# Patient Record
Sex: FEMALE | Race: WHITE | NOT HISPANIC OR LATINO | URBAN - METROPOLITAN AREA
[De-identification: names, ages, dates, MRNs, and addresses within clinical notes are randomized per-mention and may not be internally consistent; named-entity substitution may affect disease eponyms.]

---

## 2017-09-12 ENCOUNTER — INPATIENT (INPATIENT)
Facility: HOSPITAL | Age: 82
LOS: 5 days | Discharge: NURSING, REHAB OR LONG-TERM CARE | DRG: 481 | End: 2017-09-18
Attending: INTERNAL MEDICINE | Admitting: INTERNAL MEDICINE
Payer: MEDICARE

## 2017-09-12 VITALS
SYSTOLIC BLOOD PRESSURE: 162 MMHG | OXYGEN SATURATION: 98 % | WEIGHT: 119.05 LBS | RESPIRATION RATE: 18 BRPM | DIASTOLIC BLOOD PRESSURE: 73 MMHG | TEMPERATURE: 98 F | HEART RATE: 63 BPM

## 2017-09-12 LAB
ALBUMIN SERPL ELPH-MCNC: 3.8 G/DL — SIGNIFICANT CHANGE UP (ref 3.3–5.2)
ALP SERPL-CCNC: 69 U/L — SIGNIFICANT CHANGE UP (ref 40–120)
ALT FLD-CCNC: 13 U/L — SIGNIFICANT CHANGE UP
ANION GAP SERPL CALC-SCNC: 15 MMOL/L — SIGNIFICANT CHANGE UP (ref 5–17)
APPEARANCE UR: CLEAR — SIGNIFICANT CHANGE UP
APTT BLD: 33 SEC — SIGNIFICANT CHANGE UP (ref 27.5–37.4)
AST SERPL-CCNC: 23 U/L — SIGNIFICANT CHANGE UP
BASOPHILS # BLD AUTO: 0 K/UL — SIGNIFICANT CHANGE UP (ref 0–0.2)
BASOPHILS NFR BLD AUTO: 0.2 % — SIGNIFICANT CHANGE UP (ref 0–2)
BILIRUB SERPL-MCNC: 0.3 MG/DL — LOW (ref 0.4–2)
BILIRUB UR-MCNC: NEGATIVE — SIGNIFICANT CHANGE UP
BUN SERPL-MCNC: 23 MG/DL — HIGH (ref 8–20)
CALCIUM SERPL-MCNC: 9.5 MG/DL — SIGNIFICANT CHANGE UP (ref 8.6–10.2)
CHLORIDE SERPL-SCNC: 98 MMOL/L — SIGNIFICANT CHANGE UP (ref 98–107)
CO2 SERPL-SCNC: 22 MMOL/L — SIGNIFICANT CHANGE UP (ref 22–29)
COLOR SPEC: YELLOW — SIGNIFICANT CHANGE UP
CREAT SERPL-MCNC: 0.94 MG/DL — SIGNIFICANT CHANGE UP (ref 0.5–1.3)
DIFF PNL FLD: ABNORMAL
EOSINOPHIL # BLD AUTO: 0 K/UL — SIGNIFICANT CHANGE UP (ref 0–0.5)
EOSINOPHIL NFR BLD AUTO: 0.4 % — SIGNIFICANT CHANGE UP (ref 0–6)
GLUCOSE SERPL-MCNC: 110 MG/DL — SIGNIFICANT CHANGE UP (ref 70–115)
GLUCOSE UR QL: NEGATIVE MG/DL — SIGNIFICANT CHANGE UP
HCT VFR BLD CALC: 36.4 % — LOW (ref 37–47)
HGB BLD-MCNC: 12 G/DL — SIGNIFICANT CHANGE UP (ref 12–16)
INR BLD: 1.01 RATIO — SIGNIFICANT CHANGE UP (ref 0.88–1.16)
KETONES UR-MCNC: NEGATIVE — SIGNIFICANT CHANGE UP
LACTATE BLDV-MCNC: 2.6 MMOL/L — HIGH (ref 0.5–2)
LEUKOCYTE ESTERASE UR-ACNC: ABNORMAL
LIDOCAIN IGE QN: 35 U/L — SIGNIFICANT CHANGE UP (ref 22–51)
LYMPHOCYTES # BLD AUTO: 18.2 % — LOW (ref 20–55)
LYMPHOCYTES # BLD AUTO: 2.4 K/UL — SIGNIFICANT CHANGE UP (ref 1–4.8)
MCHC RBC-ENTMCNC: 30.5 PG — SIGNIFICANT CHANGE UP (ref 27–31)
MCHC RBC-ENTMCNC: 33 G/DL — SIGNIFICANT CHANGE UP (ref 32–36)
MCV RBC AUTO: 92.6 FL — SIGNIFICANT CHANGE UP (ref 81–99)
MONOCYTES # BLD AUTO: 0.8 K/UL — SIGNIFICANT CHANGE UP (ref 0–0.8)
MONOCYTES NFR BLD AUTO: 6.1 % — SIGNIFICANT CHANGE UP (ref 3–10)
NEUTROPHILS # BLD AUTO: 9.7 K/UL — HIGH (ref 1.8–8)
NEUTROPHILS NFR BLD AUTO: 74.9 % — HIGH (ref 37–73)
NITRITE UR-MCNC: NEGATIVE — SIGNIFICANT CHANGE UP
PH UR: 8 — SIGNIFICANT CHANGE UP (ref 5–8)
PLATELET # BLD AUTO: 300 K/UL — SIGNIFICANT CHANGE UP (ref 150–400)
POTASSIUM SERPL-MCNC: 5 MMOL/L — SIGNIFICANT CHANGE UP (ref 3.5–5.3)
POTASSIUM SERPL-SCNC: 5 MMOL/L — SIGNIFICANT CHANGE UP (ref 3.5–5.3)
PROT SERPL-MCNC: 8.4 G/DL — SIGNIFICANT CHANGE UP (ref 6.6–8.7)
PROT UR-MCNC: 15 MG/DL
PROTHROM AB SERPL-ACNC: 11.1 SEC — SIGNIFICANT CHANGE UP (ref 9.8–12.7)
RBC # BLD: 3.93 M/UL — LOW (ref 4.4–5.2)
RBC # FLD: 15.4 % — SIGNIFICANT CHANGE UP (ref 11–15.6)
SODIUM SERPL-SCNC: 135 MMOL/L — SIGNIFICANT CHANGE UP (ref 135–145)
SP GR SPEC: 1.01 — SIGNIFICANT CHANGE UP (ref 1.01–1.02)
TYPE + AB SCN PNL BLD: SIGNIFICANT CHANGE UP
UROBILINOGEN FLD QL: NEGATIVE MG/DL — SIGNIFICANT CHANGE UP
WBC # BLD: 13 K/UL — HIGH (ref 4.8–10.8)
WBC # FLD AUTO: 13 K/UL — HIGH (ref 4.8–10.8)

## 2017-09-12 PROCEDURE — 73502 X-RAY EXAM HIP UNI 2-3 VIEWS: CPT | Mod: 26,LT

## 2017-09-12 PROCEDURE — 74177 CT ABD & PELVIS W/CONTRAST: CPT | Mod: 26

## 2017-09-12 RX ORDER — SODIUM CHLORIDE 9 MG/ML
1000 INJECTION INTRAMUSCULAR; INTRAVENOUS; SUBCUTANEOUS ONCE
Qty: 0 | Refills: 0 | Status: COMPLETED | OUTPATIENT
Start: 2017-09-12 | End: 2017-09-12

## 2017-09-12 RX ORDER — CEFTRIAXONE 500 MG/1
1 INJECTION, POWDER, FOR SOLUTION INTRAMUSCULAR; INTRAVENOUS ONCE
Qty: 0 | Refills: 0 | Status: COMPLETED | OUTPATIENT
Start: 2017-09-12 | End: 2017-09-12

## 2017-09-12 RX ORDER — DIAZEPAM 5 MG
2 TABLET ORAL ONCE
Qty: 0 | Refills: 0 | Status: DISCONTINUED | OUTPATIENT
Start: 2017-09-12 | End: 2017-09-12

## 2017-09-12 RX ADMIN — SODIUM CHLORIDE 1000 MILLILITER(S): 9 INJECTION INTRAMUSCULAR; INTRAVENOUS; SUBCUTANEOUS at 18:18

## 2017-09-12 RX ADMIN — SODIUM CHLORIDE 1000 MILLILITER(S): 9 INJECTION INTRAMUSCULAR; INTRAVENOUS; SUBCUTANEOUS at 19:45

## 2017-09-12 RX ADMIN — CEFTRIAXONE 100 GRAM(S): 500 INJECTION, POWDER, FOR SOLUTION INTRAMUSCULAR; INTRAVENOUS at 20:42

## 2017-09-12 RX ADMIN — Medication 2 MILLIGRAM(S): at 18:26

## 2017-09-12 NOTE — ED ADULT NURSE REASSESSMENT NOTE - NS ED NURSE REASSESS COMMENT FT1
Received patient at this point alert skin warm and dry color good medicated as ordered awaiting cat scan drinking juice pain in hip will continue to follow

## 2017-09-12 NOTE — ED PROVIDER NOTE - MUSCULOSKELETAL, MLM
Spine appears normal, range of motion is not limited, no muscle or joint tenderness.  No hip tenderness.

## 2017-09-12 NOTE — ED PROVIDER NOTE - CARE PLAN
Principal Discharge DX:	UTI (urinary tract infection)  Secondary Diagnosis:	Fall Principal Discharge DX:	UTI (urinary tract infection)  Secondary Diagnosis:	Fall  Secondary Diagnosis:	Closed fracture of neck of left femur, initial encounter

## 2017-09-12 NOTE — ED ADULT NURSE NOTE - OBJECTIVE STATEMENT
Late entry : BIBA from [facility] s/p unwitnessed fall , pt was found on the floor , c/o LLQ abd pain, pt confused at baseline, Hx of dementia

## 2017-09-12 NOTE — ED PROVIDER NOTE - OBJECTIVE STATEMENT
This patient is an 86 year old woman sent from assisted living facility to rule out UTI.  Patient reports 1 day of lower abdominal pain 10/10 in severity.  She denies fever, dysuria, This patient is an 86 year old woman sent from assisted living facility to rule out UTI.  Patient reports 1 day of lower abdominal pain 10/10 in severity.  She denies fever, dysuria, nausea, and vomiting.

## 2017-09-12 NOTE — ED PROVIDER NOTE - PROGRESS NOTE DETAILS
Patient's daughter at bedside reporting that patient had a fall today and was found down at the facility. As per sign-out from Dr. Jurado patient with abdominal tenderness possibly due to fall versus UTI. Currently pending Ct abdomen/pelvis. CT is as noted. Patient continues to have left lower quadrant hip pain with multiple episodes going to bathroom. Will reassess after pain medication. IF she can not ambulate then placement will be needed. Patient with improved pain but continues to have discomfort with ROM of the left hip, pain localized to anterior left groin region. Patient will undergo PT and SW consult for rehab placement and pain control. pt signd out to me s/p unwitnessed fall awaiting physical therapy and social work consult. radiology called tosay pt had a femoral neck fracture and she will be admitted to medicine

## 2017-09-13 DIAGNOSIS — G30.1 ALZHEIMER'S DISEASE WITH LATE ONSET: ICD-10-CM

## 2017-09-13 DIAGNOSIS — M19.90 UNSPECIFIED OSTEOARTHRITIS, UNSPECIFIED SITE: ICD-10-CM

## 2017-09-13 DIAGNOSIS — N39.0 URINARY TRACT INFECTION, SITE NOT SPECIFIED: ICD-10-CM

## 2017-09-13 DIAGNOSIS — K57.30 DIVERTICULOSIS OF LARGE INTESTINE WITHOUT PERFORATION OR ABSCESS WITHOUT BLEEDING: ICD-10-CM

## 2017-09-13 DIAGNOSIS — S72.002A FRACTURE OF UNSPECIFIED PART OF NECK OF LEFT FEMUR, INITIAL ENCOUNTER FOR CLOSED FRACTURE: ICD-10-CM

## 2017-09-13 DIAGNOSIS — N30.00 ACUTE CYSTITIS WITHOUT HEMATURIA: ICD-10-CM

## 2017-09-13 DIAGNOSIS — H35.30 UNSPECIFIED MACULAR DEGENERATION: ICD-10-CM

## 2017-09-13 DIAGNOSIS — F03.90 UNSPECIFIED DEMENTIA, UNSPECIFIED SEVERITY, WITHOUT BEHAVIORAL DISTURBANCE, PSYCHOTIC DISTURBANCE, MOOD DISTURBANCE, AND ANXIETY: ICD-10-CM

## 2017-09-13 DIAGNOSIS — M81.6 LOCALIZED OSTEOPOROSIS [LEQUESNE]: ICD-10-CM

## 2017-09-13 PROCEDURE — 99223 1ST HOSP IP/OBS HIGH 75: CPT | Mod: AI

## 2017-09-13 PROCEDURE — 99285 EMERGENCY DEPT VISIT HI MDM: CPT

## 2017-09-13 PROCEDURE — 99223 1ST HOSP IP/OBS HIGH 75: CPT | Mod: 57

## 2017-09-13 PROCEDURE — 93010 ELECTROCARDIOGRAM REPORT: CPT

## 2017-09-13 PROCEDURE — 93306 TTE W/DOPPLER COMPLETE: CPT | Mod: 26

## 2017-09-13 RX ORDER — MORPHINE SULFATE 50 MG/1
2 CAPSULE, EXTENDED RELEASE ORAL ONCE
Qty: 0 | Refills: 0 | Status: DISCONTINUED | OUTPATIENT
Start: 2017-09-13 | End: 2017-09-13

## 2017-09-13 RX ORDER — MORPHINE SULFATE 50 MG/1
2 CAPSULE, EXTENDED RELEASE ORAL
Qty: 0 | Refills: 0 | Status: DISCONTINUED | OUTPATIENT
Start: 2017-09-13 | End: 2017-09-14

## 2017-09-13 RX ORDER — CEPHALEXIN 500 MG
1 CAPSULE ORAL
Qty: 40 | Refills: 0 | OUTPATIENT
Start: 2017-09-13 | End: 2017-09-23

## 2017-09-13 RX ORDER — ENOXAPARIN SODIUM 100 MG/ML
40 INJECTION SUBCUTANEOUS ONCE
Qty: 0 | Refills: 0 | Status: COMPLETED | OUTPATIENT
Start: 2017-09-13 | End: 2017-09-13

## 2017-09-13 RX ORDER — SODIUM CHLORIDE 9 MG/ML
1000 INJECTION INTRAMUSCULAR; INTRAVENOUS; SUBCUTANEOUS
Qty: 0 | Refills: 0 | Status: DISCONTINUED | OUTPATIENT
Start: 2017-09-13 | End: 2017-09-14

## 2017-09-13 RX ORDER — KETOROLAC TROMETHAMINE 30 MG/ML
15 SYRINGE (ML) INJECTION ONCE
Qty: 0 | Refills: 0 | Status: DISCONTINUED | OUTPATIENT
Start: 2017-09-13 | End: 2017-09-13

## 2017-09-13 RX ORDER — MORPHINE SULFATE 50 MG/1
1 CAPSULE, EXTENDED RELEASE ORAL ONCE
Qty: 0 | Refills: 0 | Status: DISCONTINUED | OUTPATIENT
Start: 2017-09-13 | End: 2017-09-13

## 2017-09-13 RX ORDER — MORPHINE SULFATE 50 MG/1
4 CAPSULE, EXTENDED RELEASE ORAL
Qty: 0 | Refills: 0 | Status: DISCONTINUED | OUTPATIENT
Start: 2017-09-13 | End: 2017-09-14

## 2017-09-13 RX ORDER — PANTOPRAZOLE SODIUM 20 MG/1
40 TABLET, DELAYED RELEASE ORAL
Qty: 0 | Refills: 0 | Status: DISCONTINUED | OUTPATIENT
Start: 2017-09-13 | End: 2017-09-14

## 2017-09-13 RX ORDER — CEFAZOLIN SODIUM 1 G
2000 VIAL (EA) INJECTION ONCE
Qty: 0 | Refills: 0 | Status: DISCONTINUED | OUTPATIENT
Start: 2017-09-14 | End: 2017-09-14

## 2017-09-13 RX ADMIN — MORPHINE SULFATE 2 MILLIGRAM(S): 50 CAPSULE, EXTENDED RELEASE ORAL at 04:50

## 2017-09-13 RX ADMIN — MORPHINE SULFATE 2 MILLIGRAM(S): 50 CAPSULE, EXTENDED RELEASE ORAL at 10:30

## 2017-09-13 RX ADMIN — MORPHINE SULFATE 4 MILLIGRAM(S): 50 CAPSULE, EXTENDED RELEASE ORAL at 20:05

## 2017-09-13 RX ADMIN — MORPHINE SULFATE 2 MILLIGRAM(S): 50 CAPSULE, EXTENDED RELEASE ORAL at 05:25

## 2017-09-13 RX ADMIN — MORPHINE SULFATE 1 MILLIGRAM(S): 50 CAPSULE, EXTENDED RELEASE ORAL at 02:14

## 2017-09-13 RX ADMIN — MORPHINE SULFATE 1 MILLIGRAM(S): 50 CAPSULE, EXTENDED RELEASE ORAL at 01:41

## 2017-09-13 RX ADMIN — Medication 15 MILLIGRAM(S): at 04:45

## 2017-09-13 RX ADMIN — MORPHINE SULFATE 2 MILLIGRAM(S): 50 CAPSULE, EXTENDED RELEASE ORAL at 10:15

## 2017-09-13 RX ADMIN — Medication 1 TABLET(S): at 19:44

## 2017-09-13 RX ADMIN — MORPHINE SULFATE 4 MILLIGRAM(S): 50 CAPSULE, EXTENDED RELEASE ORAL at 20:25

## 2017-09-13 RX ADMIN — SODIUM CHLORIDE 100 MILLILITER(S): 9 INJECTION INTRAMUSCULAR; INTRAVENOUS; SUBCUTANEOUS at 10:08

## 2017-09-13 RX ADMIN — ENOXAPARIN SODIUM 40 MILLIGRAM(S): 100 INJECTION SUBCUTANEOUS at 10:08

## 2017-09-13 RX ADMIN — Medication 15 MILLIGRAM(S): at 05:15

## 2017-09-13 NOTE — H&P ADULT - NSHPPHYSICALEXAM_GEN_ALL_CORE
Vital Signs Last 24 Hrs  T(C): 37.1 (13 Sep 2017 11:45), Max: 38.1 (12 Sep 2017 19:16)  T(F): 98.7 (13 Sep 2017 11:45), Max: 100.5 (12 Sep 2017 19:16)  HR: 69 (13 Sep 2017 11:45) (63 - 84)  BP: 128/66 (13 Sep 2017 11:45) (128/66 - 178/96)  BP(mean): --  RR: 18 (13 Sep 2017 11:45) (18 - 18)  SpO2: 95% (13 Sep 2017 11:45) (95% - 98%)

## 2017-09-13 NOTE — PROGRESS NOTE ADULT - SUBJECTIVE AND OBJECTIVE BOX
pt is a 86 year old female who is scheduled for a left hip pinning on 9/14/2017. pt denies any LOC.     pmx- mild dementia.  NKDA    airway- upper dentures. Mallampati 2.    ASA 2.    discussed general anesthesia/regional anesthesia.

## 2017-09-13 NOTE — CONSULT NOTE ADULT - SUBJECTIVE AND OBJECTIVE BOX
Pt Name: MARITA LY    MRN: [id number]      Patient is a 86y Female presenting to the emergency department with a chief complaint of left abdominal / hip pain.    Patient is a 86y old  Female who presents with a chief complaint of left abdominal  and hip pain after a possible fall. Patient is a resident at an assisted living facility and has a history of dementia. She reports twisting her hip when attempting to stand. She is unable to provide more detail. She reports of a soreness or ache within her left lower abdominal quadrant / hip area. No other extremity pain reported. She does not use a walker or cane.      REVIEW OF SYSTEMS    General: Alert, responsive, in NAD    Skin/Breast: No rashes, no pruritis   	  Ophthalmologic: No visual changes. No redness.   	  ENMT:	No discharge. No swelling.    Respiratory and Thorax: No difficulty breathing. No cough.  	   Cardiovascular:	No chest pain. No palpitations.    Gastrointestinal:	 No abdominal pain. No diarrhea.     Genitourinary: + dysuria. No bleeding.    Musculoskeletal: SEE HPI.    Neurological: No sensory or motor changes.     Psychiatric: No anxiety or depression.    Hematology/Lymphatics: No swelling.    Endocrine: No Hx of diabetes.    ROS is otherwise negative.    PAST MEDICAL & SURGICAL HISTORY:  Diverticula of colon  Macular degeneration  Arthritis  No significant past surgical history      Allergies: No Known Allergies      Medications: reviewed    FAMILY HISTORY: No pertinent family history in first degree relatives    Social History: non-smoker        Ambulation: Walking independently [x] With Cane [ ] With Walker [ ]  Bedbound [ ]                           12.0   13.0  )-----------( 300      ( 12 Sep 2017 17:17 )             36.4       09-12    135  |  98  |  23.0<H>  ----------------------------<  110  5.0   |  22.0  |  0.94    Ca    9.5      12 Sep 2017 17:17    TPro  8.4  /  Alb  3.8  /  TBili  0.3<L>  /  DBili  x   /  AST  23  /  ALT  13  /  AlkPhos  69  09-12      Vital Signs Last 24 Hrs  T(C): 36.7 (13 Sep 2017 08:34), Max: 38.1 (12 Sep 2017 19:16)  T(F): 98 (13 Sep 2017 08:34), Max: 100.5 (12 Sep 2017 19:16)  HR: 84 (13 Sep 2017 08:34) (63 - 84)  BP: 178/96 (13 Sep 2017 08:34) (162/73 - 178/96)  BP(mean): --  RR: 18 (13 Sep 2017 08:34) (18 - 18)  SpO2: 95% (13 Sep 2017 08:34) (95% - 98%)        PHYSICAL EXAM:      Appearance: Alert to name, responsive, in no acute distress.    Neurological: Sensation is grossly intact to light touch. 5/5 motor function of all extremities. No focal deficits or weaknesses found.    Skin: no rash on visible skin. Skin is clean, dry and intact. No bleeding. No abrasions. No ulcerations.    Vascular: 2+ distal pulses. Cap refill < 2 sec. No signs of venous insufficiency or stasis. No extremity ulcerations. No cyanosis.    Musculoskeletal:         Left Upper Extremity:  + NROM. Non-tender. No signs of trauma.        Right Upper Extremity: + NROM. Non-tender. No signs of trauma.        Left Lower Extremity:  + mild hip discomfort with passive ROM. No knee or ankle tenderness.        Right Lower Extremity: + NROM. Non-tender. No signs of trauma. No knee tenderness or effusion    Imaging Studies:    A/P:  Pt is a  86y Female with  left hip pain  found to have non-displaced femoral neck fracture    PLAN:   * NPO for OR tomorrow  * IV fluids ordered and to start once NPO  * Pre-operative ABX ordered  * Routine daily anticoagulation held for OR  * Single dose anticoaguation ordered  * Medical clearance requested for procedure  * Bed rest

## 2017-09-13 NOTE — H&P ADULT - PMH
Arthritis    Diverticula of colon    Late onset Alzheimer's disease without behavioral disturbance    Macular degeneration

## 2017-09-13 NOTE — CONSULT NOTE ADULT - SUBJECTIVE AND OBJECTIVE BOX
MARITA LY  [id number]      HPI:  86 year old female with a PMH of Diverticula of the colon, Macular degeneration, Dementia, and arthritis arrived in the ED complaining left hip pain.  Patient is a poor historian due to dementia and much history is taken from the chart.  Patient reports she got up and twisted and fell.  Denies having had any other symptoms at that time.  Denies CP/SOB/palps/dizziness pre/post fall.  Patient now planned for hip repair.  Patient also noted to have UTI.  Denies any c/o at this time.  States she normally walks a lot without limitation.        ALLERGIES:  No Known Allergies      PAST MEDICAL & SURGICAL HISTORY:  As noted above    MEDICATIONS (HOME):  Boniva  Protonix  Calcium      SOCIAL HISTORY:  Patient denies alcohol, tobacco, drug use    FAMILY HISTORY:  Patient does not know      ROS:  Patient denies cough, and other than noted above full ROS is unremarkable      PHYSICAL EXAM:  Vital Signs Last 24 Hrs  T(C): 36.7 (13 Sep 2017 14:51), Max: 38.1 (12 Sep 2017 19:16)  T(F): 98 (13 Sep 2017 14:51), Max: 100.5 (12 Sep 2017 19:16)  HR: 68 (13 Sep 2017 14:51) (68 - 84)  BP: 152/80 (13 Sep 2017 14:51) (128/66 - 178/96)  BP(mean): --  RR: 19 (13 Sep 2017 14:51) (18 - 19)  SpO2: 95% (13 Sep 2017 11:45) (95% - 96%)  General: Patient comfortable in NAD  HEENT: NCAT, mmm, EOMI  Neck: no JVD, no carotid bruits  CVS: nl s1, split s2, no s3, +s4, +JACKIE, RRR  Chest: CTA b/l  Abdomen: soft, nt/nd  Extremities: No c/c/e  Neuro: A&O x3  Psych: Normal affect      ECG:  SR with borderline AS Q's, no ischemic changes    LABS:                        12.0   13.0  )-----------( 300      ( 12 Sep 2017 17:17 )             36.4     09-12    135  |  98  |  23.0<H>  ----------------------------<  110  5.0   |  22.0  |  0.94    Ca    9.5      12 Sep 2017 17:17    TPro  8.4  /  Alb  3.8  /  TBili  0.3<L>  /  DBili  x   /  AST  23  /  ALT  13  /  AlkPhos  69  09-12        PT/INR - ( 12 Sep 2017 17:17 )   PT: 11.1 sec;   INR: 1.01 ratio         PTT - ( 12 Sep 2017 17:17 )  PTT:33.0 sec      RADIOLOGY:  CXR:  Clear  lungs.  No acute airspace disease suggested.      Assessment:  86 year old female with a PMH of Diverticula of the colon, Macular degeneration, Dementia, and arthritis arrived in the ED complaining left hip pain.  Patient with apparent unwitnessed fall, patient denies syncope, reports mechanical but has some confusion  No c/o now.  States she normally walks without limitations.  ?AS murmur on exam.    Plan:  1. Tele  2. Echo  3. If echo unremarkable no CV contraindication to surgery  4. Periop monitor  5. No CV meds now    Will follow.  Thanks!

## 2017-09-13 NOTE — ED POST DISCHARGE NOTE - RESULT SUMMARY
spoke to radiologist Misa, +femoral neck FX, made MD Ruiz and RN Deann JANE aware of findings to consult ortho.

## 2017-09-13 NOTE — PROGRESS NOTE ADULT - PROBLEM SELECTOR PLAN 2
Patient is scheduled for surgery tomorrow. Patient is scheduled for surgery tomorrow. Patient has started on prophylactic Lovenox and is on IV fluids.

## 2017-09-13 NOTE — ED ADULT NURSE REASSESSMENT NOTE - NS ED NURSE REASSESS COMMENT FT1
pt. received from night RN. pt. awake, alert, oriented x3. pt. denies pain or discomfort a pt. received from night RN. pt. awake, alert, oriented x3. pt. denies pain or discomfort at this time. pt. and family informed on plan of care. awaiting orthopaedics

## 2017-09-13 NOTE — H&P ADULT - PROBLEM SELECTOR PLAN 1
Patient scheduled for pinning in am.  Add calcium and vitamin d when stable  Assess vitamin d level  Patient was on bisphosphonate, consider Forteo.

## 2017-09-13 NOTE — PROGRESS NOTE ADULT - PROBLEM SELECTOR PROBLEM 5
Diverticulosis of large intestine without hemorrhage Localized osteoporosis, unspecified pathological fracture presence

## 2017-09-13 NOTE — PROVIDER CONTACT NOTE (OTHER) - SITUATION
Spoke with Dr. Ruiz to clarify if pt was cleared for PT, as per Dr. Ruiz, Pt with femoral neck fracture, and she is discontinuing PT at this time.

## 2017-09-13 NOTE — H&P ADULT - HISTORY OF PRESENT ILLNESS
A 86 year old  female with a PMH of Diverticula of the colon, Macular degeneration, Dementia, and arthritis arrived in the ED complaining left hip pain. She currently lives at a assisted living home where she was found on the ground after a fall. She has never had this type of pain before and has difficulty walking. She is also complaining of mild abdominal pain in her lower quadrants in which she claims to be constipated. X-ray shows a femoral neck fracture on her left side. Orthopedics has been consulted and she will go in for surgery tomorrow for a pin placement into her femur. Currently, she also has a UTI in which she is being treated for with Rocephin. Denies any dysuria, polyuria, polydipsia, hematuria, or stool changes.

## 2017-09-13 NOTE — PROGRESS NOTE ADULT - SUBJECTIVE AND OBJECTIVE BOX
MARITA LY     Chief Complaint: Patient is a 86y old  Female who presents with a chief complaint of hip pain    PAST MEDICAL & SURGICAL HISTORY:  Diverticula of colon  Macular degeneration  Arthritis  No significant past surgical history      HPI/OVERNIGHT EVENTS: A 86 year old  female with a PMH of Diverticula of the colon, Macular degeneration, Dementia, and arthritis arrived in the ED complaining left hip pain. She currently lives at a assisted living home where she was found on the ground after a fall. She has never had this type of pain before. She is also complaining of mild abdominal pain in her lower quadrants which is from constipation. X-ray shows a femoral neck fracture on her left side. Orthopedics has been consulted and she will go in for surgery tomorrow for a pin placement into her femur. Currently, she also has a UTI in which she is being treated for.     MEDICATIONS  (STANDING):  sodium chloride 0.9%. 1000 milliLiter(s) (100 mL/Hr) IV Continuous <Continuous>      Vital Signs Last 24 Hrs  T(C): 36.7 (13 Sep 2017 08:34), Max: 38.1 (12 Sep 2017 19:16)  T(F): 98 (13 Sep 2017 08:34), Max: 100.5 (12 Sep 2017 19:16)  HR: 84 (13 Sep 2017 08:34) (63 - 84)  BP: 178/96 (13 Sep 2017 08:34) (162/73 - 178/96)  BP(mean): --  RR: 18 (13 Sep 2017 08:34) (18 - 18)  SpO2: 95% (13 Sep 2017 08:34) (95% - 98%)    PHYSICAL EXAM:  Constitutional: NAD, well-groomed, well-developed  HEENT: PERRLA, EOMI, Normal Hearing, MMM  Neck: No LAD, No JVD  Back: Normal spine flexure, No CVA tenderness  Respiratory: CTAB Cardiovascular: S1 and S2, RRR, no M/G/R  Gastrointestinal: BS+, soft, NT/ND  Extremities: No peripheral edema  Vascular: 2+ peripheral pulses  Neurological: A/O x 3, no focal deficits  Psychiatric: Normal mood, normal affect  Musculoskeletal: 5/5 strength b/l upper and lower extremities  Skin: No rashes    CAPILLARY BLOOD GLUCOSE    LABS:                        12.0   13.0  )-----------( 300      ( 12 Sep 2017 17:17 )             36.4         135  |  98  |  23.0<H>  ----------------------------<  110  5.0   |  22.0  |  0.94    Ca    9.5      12 Sep 2017 17:17    TPro  8.4  /  Alb  3.8  /  TBili  0.3<L>  /  DBili  x   /  AST  23  /  ALT  13  /  AlkPhos  69      PT/INR - ( 12 Sep 2017 17:17 )   PT: 11.1 sec;   INR: 1.01 ratio         PTT - ( 12 Sep 2017 17:17 )  PTT:33.0 sec  Urinalysis Basic - ( 12 Sep 2017 15:46 )    Color: Yellow / Appearance: Clear / S.015 / pH: x  Gluc: x / Ketone: Negative  / Bili: Negative / Urobili: Negative mg/dL   Blood: x / Protein: 15 mg/dL / Nitrite: Negative   Leuk Esterase: Moderate / RBC: 3-5 /HPF / WBC 11-25   Sq Epi: x / Non Sq Epi: x / Bacteria: Few        RADIOLOGY & ADDITIONAL TESTS: MARITA LY     Chief Complaint: Patient is a 86y old  Female who presents with a chief complaint of hip pain    PAST MEDICAL & SURGICAL HISTORY:  Diverticula of colon  Macular degeneration  Arthritis  No significant past surgical history      HPI/OVERNIGHT EVENTS: A 86 year old  female with a PMH of Diverticula of the colon, Macular degeneration, Dementia, and arthritis arrived in the ED complaining left hip pain. She currently lives at a assisted living home where she was found on the ground after a fall. She has never had this type of pain before and has difficulty walking. She is also complaining of mild abdominal pain in her lower quadrants in which she claims to be constipated. X-ray shows a femoral neck fracture on her left side. Orthopedics has been consulted and she will go in for surgery tomorrow for a pin placement into her femur. Currently, she also has a UTI in which she is being treated for with Rocephin.      MEDICATIONS  (STANDING):  sodium chloride 0.9%. 1000 milliLiter(s) (100 mL/Hr) IV Continuous <Continuous>      Vital Signs Last 24 Hrs  T(C): 36.7 (13 Sep 2017 08:34), Max: 38.1 (12 Sep 2017 19:16)  T(F): 98 (13 Sep 2017 08:34), Max: 100.5 (12 Sep 2017 19:16)  HR: 84 (13 Sep 2017 08:34) (63 - 84)  BP: 178/96 (13 Sep 2017 08:34) (162/73 - 178/96)  BP(mean): --  RR: 18 (13 Sep 2017 08:34) (18 - 18)  SpO2: 95% (13 Sep 2017 08:34) (95% - 98%)    PHYSICAL EXAM:  Constitutional: NAD, well-groomed, well-developed  HEENT: PERRLA, EOMI, Normal Hearing, MMM  Neck: No LAD, No JVD  Back: Normal spine flexure, No CVA tenderness  Respiratory: CTAB Cardiovascular: S1 and S2, RRR, no M/G/R  Gastrointestinal: BS+, soft, mild abdominal pain is found in her lower quadrants.  Extremities: No peripheral edema is found.  Vascular: 2+ peripheral pulses  Neurological: A/O x 3, no focal deficits. Patient has mild memory loss but is cognoscente of her current condition.    Psychiatric: Normal mood, normal affect  Musculoskeletal: Lower left extremity is shortened and mildly rotated. 5/5 strength b/l upper and lower extremities  Skin: No rashes, lesions, pruritis. Good turgor and mobility    CAPILLARY BLOOD GLUCOSE    LABS:                        12.0   13.0  )-----------( 300      ( 12 Sep 2017 17:17 )             36.4         135  |  98  |  23.0<H>  ----------------------------<  110  5.0   |  22.0  |  0.94    Ca    9.5      12 Sep 2017 17:17    TPro  8.4  /  Alb  3.8  /  TBili  0.3<L>  /  DBili  x   /  AST  23  /  ALT  13  /  AlkPhos  69      PT/INR - ( 12 Sep 2017 17:17 )   PT: 11.1 sec;   INR: 1.01 ratio         PTT - ( 12 Sep 2017 17:17 )  PTT:33.0 sec  Urinalysis Basic - ( 12 Sep 2017 15:46 )    Color: Yellow / Appearance: Clear / S.015 / pH: x  Gluc: x / Ketone: Negative  / Bili: Negative / Urobili: Negative mg/dL   Blood: x / Protein: 15 mg/dL / Nitrite: Negative   Leuk Esterase: Moderate / RBC: 3-5 /HPF / WBC 11-25   Sq Epi: x / Non Sq Epi: x / Bacteria: Few        RADIOLOGY & ADDITIONAL TESTS: MARITA LY     Chief Complaint: Patient is a 86y old  Female who presents with a chief complaint of hip pain    PAST MEDICAL & SURGICAL HISTORY:  Diverticula of colon  Macular degeneration  Arthritis  No significant past surgical history      HPI/OVERNIGHT EVENTS: A 86 year old  female with a PMH of Diverticula of the colon, Macular degeneration, Dementia, and arthritis arrived in the ED complaining left hip pain. She currently lives at a assisted living home where she was found on the ground after a fall. She has never had this type of pain before and has difficulty walking. She is also complaining of mild abdominal pain in her lower quadrants in which she claims to be constipated. X-ray shows a femoral neck fracture on her left side. Orthopedics has been consulted and she will go in for surgery tomorrow for a pin placement into her femur. Currently, she also has a UTI in which she is being treated for with Rocephin. Denies any dysuria, polyuria, polydipsia, hematuria, or stool changes.       MEDICATIONS  (STANDING):  sodium chloride 0.9%. 1000 milliLiter(s) (100 mL/Hr) IV Continuous <Continuous>      Vital Signs Last 24 Hrs  T(C): 36.7 (13 Sep 2017 08:34), Max: 38.1 (12 Sep 2017 19:16)  T(F): 98 (13 Sep 2017 08:34), Max: 100.5 (12 Sep 2017 19:16)  HR: 84 (13 Sep 2017 08:34) (63 - 84)  BP: 178/96 (13 Sep 2017 08:34) (162/73 - 178/96)  BP(mean): --  RR: 18 (13 Sep 2017 08:34) (18 - 18)  SpO2: 95% (13 Sep 2017 08:34) (95% - 98%)    PHYSICAL EXAM:  Constitutional: NAD, well-groomed, well-developed  HEENT: PERRLA, EOMI, Normal Hearing, MMM  Neck: No LAD, No JVD  Back: Normal spine flexure, No CVA tenderness  Respiratory: CTAB Cardiovascular: S1 and S2, RRR, no M/G/R  Gastrointestinal: BS+, soft, mild abdominal pain is found in her lower quadrants.  Extremities: No peripheral edema is found.  Vascular: 2+ peripheral pulses  Neurological: A/O x 3, no focal deficits. Patient has mild memory loss but is cognoscente of her current condition.    Psychiatric: Normal mood, normal affect  Musculoskeletal: Lower left extremity is shortened and mildly rotated. 5/5 strength b/l upper and lower extremities  Skin: No rashes, lesions, pruritis. Good turgor and mobility    CAPILLARY BLOOD GLUCOSE    LABS:                        12.0   13.0  )-----------( 300      ( 12 Sep 2017 17:17 )             36.4     -    135  |  98  |  23.0<H>  ----------------------------<  110  5.0   |  22.0  |  0.94    Ca    9.5      12 Sep 2017 17:17    TPro  8.4  /  Alb  3.8  /  TBili  0.3<L>  /  DBili  x   /  AST  23  /  ALT  13  /  AlkPhos  69  -12    PT/INR - ( 12 Sep 2017 17:17 )   PT: 11.1 sec;   INR: 1.01 ratio         PTT - ( 12 Sep 2017 17:17 )  PTT:33.0 sec  Urinalysis Basic - ( 12 Sep 2017 15:46 )    Color: Yellow / Appearance: Clear / S.015 / pH: x  Gluc: x / Ketone: Negative  / Bili: Negative / Urobili: Negative mg/dL   Blood: x / Protein: 15 mg/dL / Nitrite: Negative   Leuk Esterase: Moderate / RBC: 3-5 /HPF / WBC 11-25   Sq Epi: x / Non Sq Epi: x / Bacteria: Few        RADIOLOGY & ADDITIONAL TESTS:

## 2017-09-14 ENCOUNTER — TRANSCRIPTION ENCOUNTER (OUTPATIENT)
Age: 82
End: 2017-09-14

## 2017-09-14 DIAGNOSIS — I50.32 CHRONIC DIASTOLIC (CONGESTIVE) HEART FAILURE: ICD-10-CM

## 2017-09-14 LAB
-  AMIKACIN: SIGNIFICANT CHANGE UP
-  AMPICILLIN/SULBACTAM: SIGNIFICANT CHANGE UP
-  AMPICILLIN: SIGNIFICANT CHANGE UP
-  AZTREONAM: SIGNIFICANT CHANGE UP
-  CEFAZOLIN: SIGNIFICANT CHANGE UP
-  CEFEPIME: SIGNIFICANT CHANGE UP
-  CEFOXITIN: SIGNIFICANT CHANGE UP
-  CEFTAZIDIME: SIGNIFICANT CHANGE UP
-  CEFTRIAXONE: SIGNIFICANT CHANGE UP
-  CIPROFLOXACIN: SIGNIFICANT CHANGE UP
-  ERTAPENEM: SIGNIFICANT CHANGE UP
-  GENTAMICIN: SIGNIFICANT CHANGE UP
-  IMIPENEM: SIGNIFICANT CHANGE UP
-  LEVOFLOXACIN: SIGNIFICANT CHANGE UP
-  MEROPENEM: SIGNIFICANT CHANGE UP
-  NITROFURANTOIN: SIGNIFICANT CHANGE UP
-  PIPERACILLIN/TAZOBACTAM: SIGNIFICANT CHANGE UP
-  TOBRAMYCIN: SIGNIFICANT CHANGE UP
-  TRIMETHOPRIM/SULFAMETHOXAZOLE: SIGNIFICANT CHANGE UP
ABO RH CONFIRMATION: SIGNIFICANT CHANGE UP
ANION GAP SERPL CALC-SCNC: 15 MMOL/L — SIGNIFICANT CHANGE UP (ref 5–17)
BUN SERPL-MCNC: 16 MG/DL — SIGNIFICANT CHANGE UP (ref 8–20)
CALCIUM SERPL-MCNC: 9.2 MG/DL — SIGNIFICANT CHANGE UP (ref 8.4–10.5)
CALCIUM SERPL-MCNC: 9.2 MG/DL — SIGNIFICANT CHANGE UP (ref 8.6–10.2)
CHLORIDE SERPL-SCNC: 99 MMOL/L — SIGNIFICANT CHANGE UP (ref 98–107)
CO2 SERPL-SCNC: 22 MMOL/L — SIGNIFICANT CHANGE UP (ref 22–29)
CREAT SERPL-MCNC: 0.88 MG/DL — SIGNIFICANT CHANGE UP (ref 0.5–1.3)
CULTURE RESULTS: SIGNIFICANT CHANGE UP
GLUCOSE SERPL-MCNC: 98 MG/DL — SIGNIFICANT CHANGE UP (ref 70–115)
HCT VFR BLD CALC: 36 % — LOW (ref 37–47)
HGB BLD-MCNC: 12.2 G/DL — SIGNIFICANT CHANGE UP (ref 12–16)
MAGNESIUM SERPL-MCNC: 1.9 MG/DL — SIGNIFICANT CHANGE UP (ref 1.6–2.6)
MCHC RBC-ENTMCNC: 30.9 PG — SIGNIFICANT CHANGE UP (ref 27–31)
MCHC RBC-ENTMCNC: 33.9 G/DL — SIGNIFICANT CHANGE UP (ref 32–36)
MCV RBC AUTO: 91.1 FL — SIGNIFICANT CHANGE UP (ref 81–99)
METHOD TYPE: SIGNIFICANT CHANGE UP
ORGANISM # SPEC MICROSCOPIC CNT: SIGNIFICANT CHANGE UP
ORGANISM # SPEC MICROSCOPIC CNT: SIGNIFICANT CHANGE UP
PHOSPHATE SERPL-MCNC: 2.8 MG/DL — SIGNIFICANT CHANGE UP (ref 2.4–4.7)
PLATELET # BLD AUTO: 314 K/UL — SIGNIFICANT CHANGE UP (ref 150–400)
POTASSIUM SERPL-MCNC: 3.9 MMOL/L — SIGNIFICANT CHANGE UP (ref 3.5–5.3)
POTASSIUM SERPL-SCNC: 3.9 MMOL/L — SIGNIFICANT CHANGE UP (ref 3.5–5.3)
PTH-INTACT FLD-MCNC: 34 PG/ML — SIGNIFICANT CHANGE UP (ref 15–65)
RBC # BLD: 3.95 M/UL — LOW (ref 4.4–5.2)
RBC # FLD: 15.5 % — SIGNIFICANT CHANGE UP (ref 11–15.6)
SODIUM SERPL-SCNC: 136 MMOL/L — SIGNIFICANT CHANGE UP (ref 135–145)
SPECIMEN SOURCE: SIGNIFICANT CHANGE UP
WBC # BLD: 12.8 K/UL — HIGH (ref 4.8–10.8)
WBC # FLD AUTO: 12.8 K/UL — HIGH (ref 4.8–10.8)

## 2017-09-14 PROCEDURE — 27235 TREAT THIGH FRACTURE: CPT | Mod: LT

## 2017-09-14 PROCEDURE — 73502 X-RAY EXAM HIP UNI 2-3 VIEWS: CPT | Mod: 26,LT

## 2017-09-14 PROCEDURE — 99233 SBSQ HOSP IP/OBS HIGH 50: CPT

## 2017-09-14 RX ORDER — SODIUM CHLORIDE 9 MG/ML
1000 INJECTION, SOLUTION INTRAVENOUS
Qty: 0 | Refills: 0 | Status: DISCONTINUED | OUTPATIENT
Start: 2017-09-14 | End: 2017-09-14

## 2017-09-14 RX ORDER — OXYCODONE HYDROCHLORIDE 5 MG/1
5 TABLET ORAL EVERY 4 HOURS
Qty: 0 | Refills: 0 | Status: DISCONTINUED | OUTPATIENT
Start: 2017-09-14 | End: 2017-09-14

## 2017-09-14 RX ORDER — CEFAZOLIN SODIUM 1 G
2000 VIAL (EA) INJECTION
Qty: 0 | Refills: 0 | Status: COMPLETED | OUTPATIENT
Start: 2017-09-14 | End: 2017-09-14

## 2017-09-14 RX ORDER — FENTANYL CITRATE 50 UG/ML
25 INJECTION INTRAVENOUS
Qty: 0 | Refills: 0 | Status: DISCONTINUED | OUTPATIENT
Start: 2017-09-14 | End: 2017-09-14

## 2017-09-14 RX ORDER — MORPHINE SULFATE 50 MG/1
2 CAPSULE, EXTENDED RELEASE ORAL EVERY 4 HOURS
Qty: 0 | Refills: 0 | Status: DISCONTINUED | OUTPATIENT
Start: 2017-09-14 | End: 2017-09-18

## 2017-09-14 RX ORDER — OXYCODONE HYDROCHLORIDE 5 MG/1
10 TABLET ORAL EVERY 4 HOURS
Qty: 0 | Refills: 0 | Status: DISCONTINUED | OUTPATIENT
Start: 2017-09-14 | End: 2017-09-18

## 2017-09-14 RX ORDER — ONDANSETRON 8 MG/1
4 TABLET, FILM COATED ORAL ONCE
Qty: 0 | Refills: 0 | Status: DISCONTINUED | OUTPATIENT
Start: 2017-09-14 | End: 2017-09-14

## 2017-09-14 RX ORDER — DOCUSATE SODIUM 100 MG
100 CAPSULE ORAL THREE TIMES A DAY
Qty: 0 | Refills: 0 | Status: DISCONTINUED | OUTPATIENT
Start: 2017-09-14 | End: 2017-09-18

## 2017-09-14 RX ORDER — ENOXAPARIN SODIUM 100 MG/ML
40 INJECTION SUBCUTANEOUS EVERY 24 HOURS
Qty: 0 | Refills: 0 | Status: DISCONTINUED | OUTPATIENT
Start: 2017-09-15 | End: 2017-09-18

## 2017-09-14 RX ORDER — ACETAMINOPHEN 500 MG
650 TABLET ORAL EVERY 6 HOURS
Qty: 0 | Refills: 0 | Status: DISCONTINUED | OUTPATIENT
Start: 2017-09-14 | End: 2017-09-18

## 2017-09-14 RX ORDER — MAGNESIUM HYDROXIDE 400 MG/1
30 TABLET, CHEWABLE ORAL DAILY
Qty: 0 | Refills: 0 | Status: DISCONTINUED | OUTPATIENT
Start: 2017-09-14 | End: 2017-09-18

## 2017-09-14 RX ORDER — ONDANSETRON 8 MG/1
4 TABLET, FILM COATED ORAL EVERY 6 HOURS
Qty: 0 | Refills: 0 | Status: DISCONTINUED | OUTPATIENT
Start: 2017-09-14 | End: 2017-09-18

## 2017-09-14 RX ORDER — SODIUM CHLORIDE 9 MG/ML
1000 INJECTION, SOLUTION INTRAVENOUS
Qty: 0 | Refills: 0 | Status: DISCONTINUED | OUTPATIENT
Start: 2017-09-14 | End: 2017-09-15

## 2017-09-14 RX ADMIN — FENTANYL CITRATE 25 MICROGRAM(S): 50 INJECTION INTRAVENOUS at 16:33

## 2017-09-14 RX ADMIN — Medication 100 MILLIGRAM(S): at 21:36

## 2017-09-14 RX ADMIN — Medication 100 MILLIGRAM(S): at 21:35

## 2017-09-14 RX ADMIN — SODIUM CHLORIDE 100 MILLILITER(S): 9 INJECTION INTRAMUSCULAR; INTRAVENOUS; SUBCUTANEOUS at 01:47

## 2017-09-14 RX ADMIN — SODIUM CHLORIDE 80 MILLILITER(S): 9 INJECTION, SOLUTION INTRAVENOUS at 21:38

## 2017-09-14 NOTE — BRIEF OPERATIVE NOTE - PRE-OP DX
Closed fracture of neck of left femur, initial encounter  09/14/2017  minimally displaced left femoral neck fracture  Active  Maxi Fagan

## 2017-09-14 NOTE — BRIEF OPERATIVE NOTE - VENOUS THROMBOEMBOLISM PROPHYLAXIS THERAPY
contralateral SCD, LMWH while in house, d/c anticoag per medicine service given patient history of falls

## 2017-09-14 NOTE — PROGRESS NOTE ADULT - SUBJECTIVE AND OBJECTIVE BOX
MARITA LY  [id number]      Urinary tract infection  Late onset Alzheimer's disease without behavioral disturbance  Diverticula of colon  Macular degeneration  Arthritis  UTI (urinary tract infection)  Acute cystitis without hematuria  Localized osteoporosis, unspecified pathological fracture presence  Dementia without behavioral disturbance, unspecified dementia type  Diverticulosis of large intestine without hemorrhage  Closed fracture of neck of left femur, initial encounter  Urinary tract infection, site unspecified  No significant past surgical history  PELVIC PAIN,FALL  90+  Closed fracture of neck of left femur, initial encounter  Fall        Chief Complaint:  Fall with Left Hip Fx    Interval History:  Patient is a poor historian due to dementia     ceFAZolin   IVPB 2000 milliGRAM(s) IV Intermittent once  morphine  - Injectable 2 milliGRAM(s) IV Push every 3 hours PRN  morphine  - Injectable 4 milliGRAM(s) IV Push every 3 hours PRN  sodium chloride 0.9%. 1000 milliLiter(s) IV Continuous <Continuous>  pantoprazole    Tablet 40 milliGRAM(s) Oral before breakfast  calcium carbonate  625 mG + Vitamin D (OsCal 250 + D) 1 Tablet(s) Oral two times a day          Physical Exam:  T(C): 37.3 (09-14-17 @ 10:55), Max: 37.3 (09-14-17 @ 10:55)  HR: 87 (09-14-17 @ 10:55) (68 - 90)  BP: 164/87 (09-14-17 @ 10:55) (152/80 - 164/87)  RR: 21 (09-14-17 @ 10:55) (16 - 21)  SpO2: 96% (09-14-17 @ 04:43) (96% - 96%)  Wt(kg): --  General: Patient comfortable in NAD  HEENT: NCAT, mmm, EOMI  Neck: no JVD, no carotid bruits  CVS: nl s1, split s2, no s3, +s4, +JACKIE, RRR  Chest: CTA b/l  Abdomen: soft, nt/nd  Extremities: No c/c/e  Neuro: A&O x3  Psych: Normal affect      I&O's Summary    13 Sep 2017 07:01  -  14 Sep 2017 07:00  --------------------------------------------------------  IN: 620 mL / OUT: 0 mL / NET: 620 mL          Labs:   14 Sep 2017 06:07    136    |  99     |  16.0   ----------------------------<  98     3.9     |  22.0   |  0.88     Ca    9.2        14 Sep 2017 06:07  Phos  2.8       14 Sep 2017 06:07  Mg     1.9       14 Sep 2017 06:07    TPro  8.4    /  Alb  3.8    /  TBili  0.3    /  DBili  x      /  AST  23     /  ALT  13     /  AlkPhos  69     12 Sep 2017 17:17                          12.2   12.8  )-----------( 314      ( 14 Sep 2017 06:07 )             36.0     PT/INR - ( 12 Sep 2017 17:17 )   PT: 11.1 sec;   INR: 1.01 ratio         PTT - ( 12 Sep 2017 17:17 )  PTT:33.0 sec      ECHO Summary:   1. Technically good study.   2. There is moderate septal left ventricular hypertrophy.   3. Normal global left ventricular systolic function.   4. Left ventricular ejection fraction, by visual estimation, is 60 to   65%.   5. Spectral Doppler shows pseudonormal pattern of left ventricular   myocardial filling (Grade II diastolic dysfunction).   6. Elevated left atrial and left ventricular end-diastolic pressures,   estimated at 25 mmHg.   7. Severely enlarged left atrium.   8. Thickening and calcification of the anterior and posterior mitral   valve leaflets.   9. Sclerotic aortic valve with normal opening.  10. Estimated pulmonary artery systolic pressure is 38.6 mmHg assuming a   right atrial pressure of 5 mmHg, which is consistent with borderline   pulmonary hypertension.  11. Trivial pericardial effusion.  12. Decreased mobility of mitral valve leaflets. Degree of mitral   stenosis can not be quantified based on the available images but severe   mitral stenosis is unlikely. Please repeat limited study for further   evaluation.      Radiology:      Assessment:  86 year old female with a PMH of Diverticula of the colon, Macular degeneration, Dementia, and arthritis arrived in the ED complaining left hip pain.  Patient with apparent unwitnessed fall, patient denies syncope, reports mechanical but has some confusion  No c/o now.  States she normally walks without limitations.  Has aortic valve sclerosis but NO AS, perhaps mild MS, Mild PAH.    Plan:  1. Tele  3. Echo unremarkable and there are  no CV contraindication to surgery  4. Periop monitor  5. No CV meds now

## 2017-09-14 NOTE — PROGRESS NOTE ADULT - SUBJECTIVE AND OBJECTIVE BOX
MARITA LY     Chief Complaint: Patient is a 86y old  Female who presents with a chief complaint of Fall (13 Sep 2017 13:10)      PAST MEDICAL & SURGICAL HISTORY:  Late onset Alzheimer's disease without behavioral disturbance  Diverticula of colon  Macular degeneration  Arthritis  No significant past surgical history      HPI/OVERNIGHT EVENTS:    MEDICATIONS  (STANDING):  ceFAZolin   IVPB 2000 milliGRAM(s) IV Intermittent once  sodium chloride 0.9%. 1000 milliLiter(s) (100 mL/Hr) IV Continuous <Continuous>  pantoprazole    Tablet 40 milliGRAM(s) Oral before breakfast  calcium carbonate  625 mG + Vitamin D (OsCal 250 + D) 1 Tablet(s) Oral two times a day      Vital Signs Last 24 Hrs  T(C): 37.3 (14 Sep 2017 10:55), Max: 37.5 (14 Sep 2017 07:27)  T(F): 99.2 (14 Sep 2017 10:55), Max: 99.5 (14 Sep 2017 07:27)  HR: 87 (14 Sep 2017 10:55) (68 - 90)  BP: 164/87 (14 Sep 2017 10:55) (148/80 - 164/87)  BP(mean): --  RR: 21 (14 Sep 2017 10:55) (16 - 21)  SpO2: 93% (14 Sep 2017 07:27) (93% - 96%)    PHYSICAL EXAM:  Constitutional: NAD, well-groomed, well-developed  HEENT: PERRLA, EOMI, Normal Hearing, MMM  Neck: No LAD, No JVD  Back: Normal spine flexure, No CVA tenderness  Respiratory: CTAB Cardiovascular: S1 and S2, RRR, no M/G/R  Gastrointestinal: BS+, soft, NT/ND  Extremities: No peripheral edema  Vascular: 2+ peripheral pulses  Neurological: A/O x 3, no focal deficits  Psychiatric: Normal mood, normal affect  Musculoskeletal: 5/5 strength b/l upper and lower extremities  Skin: No rashes    CAPILLARY BLOOD GLUCOSE    LABS:                        12.2   12.8  )-----------( 314      ( 14 Sep 2017 06:07 )             36.0     09-14    136  |  99  |  16.0  ----------------------------<  98  3.9   |  22.0  |  0.88    Ca    9.2      14 Sep 2017 06:07  Phos  2.8     -  Mg     1.9     -    TPro  8.4  /  Alb  3.8  /  TBili  0.3<L>  /  DBili  x   /  AST  23  /  ALT  13  /  AlkPhos  69  -12    PT/INR - ( 12 Sep 2017 17:17 )   PT: 11.1 sec;   INR: 1.01 ratio         PTT - ( 12 Sep 2017 17:17 )  PTT:33.0 sec  Urinalysis Basic - ( 12 Sep 2017 15:46 )    Color: Yellow / Appearance: Clear / S.015 / pH: x  Gluc: x / Ketone: Negative  / Bili: Negative / Urobili: Negative mg/dL   Blood: x / Protein: 15 mg/dL / Nitrite: Negative   Leuk Esterase: Moderate / RBC: 3-5 /HPF / WBC 11-25   Sq Epi: x / Non Sq Epi: x / Bacteria: Few        RADIOLOGY & ADDITIONAL TESTS: MARITA LY     Chief Complaint: Patient is a 86y old  Female who presents with a chief complaint of Fall (13 Sep 2017 13:10)      PAST MEDICAL & SURGICAL HISTORY:  Late onset Alzheimer's disease without behavioral disturbance  Diverticula of colon  Macular degeneration  Arthritis  No significant past surgical history      HPI/OVERNIGHT EVENTS: No medical contraindication to surgery.    MEDICATIONS  (STANDING):  ceFAZolin   IVPB 2000 milliGRAM(s) IV Intermittent once  sodium chloride 0.9%. 1000 milliLiter(s) (100 mL/Hr) IV Continuous <Continuous>  pantoprazole    Tablet 40 milliGRAM(s) Oral before breakfast  calcium carbonate  625 mG + Vitamin D (OsCal 250 + D) 1 Tablet(s) Oral two times a day      Vital Signs Last 24 Hrs  T(C): 37.3 (14 Sep 2017 10:55), Max: 37.5 (14 Sep 2017 07:27)  T(F): 99.2 (14 Sep 2017 10:55), Max: 99.5 (14 Sep 2017 07:27)  HR: 87 (14 Sep 2017 10:55) (68 - 90)  BP: 164/87 (14 Sep 2017 10:55) (148/80 - 164/87)  BP(mean): --  RR: 21 (14 Sep 2017 10:55) (16 - 21)  SpO2: 93% (14 Sep 2017 07:27) (93% - 96%)    PHYSICAL EXAM:  Constitutional: NAD, well-groomed, well-developed  HEENT: PERRLA, EOMI, Normal Hearing, MMM  Neck: No LAD, No JVD  Back: Normal spine flexure, No CVA tenderness  Respiratory: CTAB Cardiovascular: S1 and S2, RRR, no M/G/R  Gastrointestinal: BS+, soft, NT/ND  Extremities: No peripheral edema  Vascular: 2+ peripheral pulses  Neurological: A/O x 3, no focal deficits  Psychiatric: Normal mood, normal affect  Musculoskeletal: 5/5 strength b/l upper and lower extremities  Skin: No rashes    CAPILLARY BLOOD GLUCOSE    LABS:                        12.2   12.8  )-----------( 314      ( 14 Sep 2017 06:07 )             36.0     09-14    136  |  99  |  16.0  ----------------------------<  98  3.9   |  22.0  |  0.88    Ca    9.2      14 Sep 2017 06:07  Phos  2.8     -  Mg     1.9     -    TPro  8.4  /  Alb  3.8  /  TBili  0.3<L>  /  DBili  x   /  AST  23  /  ALT  13  /  AlkPhos  69  -12    PT/INR - ( 12 Sep 2017 17:17 )   PT: 11.1 sec;   INR: 1.01 ratio         PTT - ( 12 Sep 2017 17:17 )  PTT:33.0 sec  Urinalysis Basic - ( 12 Sep 2017 15:46 )    Color: Yellow / Appearance: Clear / S.015 / pH: x  Gluc: x / Ketone: Negative  / Bili: Negative / Urobili: Negative mg/dL   Blood: x / Protein: 15 mg/dL / Nitrite: Negative   Leuk Esterase: Moderate / RBC: 3-5 /HPF / WBC 11-25   Sq Epi: x / Non Sq Epi: x / Bacteria: Few        RADIOLOGY & ADDITIONAL TESTS:

## 2017-09-14 NOTE — PROGRESS NOTE ADULT - PROBLEM SELECTOR PLAN 4
Found on echo. Discussed with Dr Wei. Cardiology input appreciated.
Patient has mild memory loss. Supportive care measures should be taken and has been discussed with the patients daughter. Patient should follow up with neurology to see is Namenda or Aricept should be considered.

## 2017-09-14 NOTE — BRIEF OPERATIVE NOTE - PROCEDURE
<<-----Click on this checkbox to enter Procedure Hip fracture pinning pathway  09/14/2017  Synthes 7.3 mm cannulated screws x3, one washer  Active  MLINN

## 2017-09-14 NOTE — PROGRESS NOTE ADULT - PROBLEM SELECTOR PLAN 3
Add calcium and vitamin d, consider forteo as she fractured while on bisphosphonate therapy.
Continue on current medications.

## 2017-09-14 NOTE — PROGRESS NOTE ADULT - SUBJECTIVE AND OBJECTIVE BOX
Ortho Post Op Check    Name: MARITA LY    MR #: [id number]    Procedure: left hip perc pinning  Surgeon: Dr. Fagan    Pt comfortable without complaints, pain controlled  Denies CP, SOB, N/V, numbness/tingling     General Exam:  Vital Signs Last 24 Hrs  T(C): 36.5 (09-14-17 @ 18:41), Max: 37.5 (09-14-17 @ 15:27)  T(F): 97.7 (09-14-17 @ 18:41), Max: 99.5 (09-14-17 @ 15:27)  HR: 84 (09-14-17 @ 18:41) (67 - 89)  BP: 142/83 (09-14-17 @ 18:41) (110/86 - 167/72)  BP(mean): --  RR: 20 (09-14-17 @ 18:41) (15 - 20)  SpO2: 98% (09-14-17 @ 18:41) (83% - 100%)    General: Pt Alert and oriented, NAD, controlled pain.  Incision intact, no dehiscence or active drainage  Dressing applied. No bleeding.  Pulses: 2+ dorsalis pedis pulse. Cap refill < 2 sec.  Sensation: Grossly intact to light touch without deficit.  Motor: + EHL/FHL/TA/GS                          12.2   12.8  )-----------( 314      ( 14 Sep 2017 06:07 )             36.0   14 Sep 2017 06:07    136    |  99     |  16.0   ----------------------------<  98     3.9     |  22.0   |  0.88     Phos  2.8       14 Sep 2017 06:07  Mg     1.9       14 Sep 2017 06:07    Post-op X-Ray: shows hardware intact, no signs of loosening    A/P: 86yFemale POD#0 s/p left hip perc pinning   - Stable  - Pain Control  - DVT ppx: as prescribed, SCD's  - Post op abx: ancef x 2 doses  - PT   - Weight bearing status: WBAT LLE

## 2017-09-15 LAB
24R-OH-CALCIDIOL SERPL-MCNC: 37.1 NG/ML — SIGNIFICANT CHANGE UP (ref 30–100)
ANION GAP SERPL CALC-SCNC: 17 MMOL/L — SIGNIFICANT CHANGE UP (ref 5–17)
BASOPHILS # BLD AUTO: 0 K/UL — SIGNIFICANT CHANGE UP (ref 0–0.2)
BASOPHILS NFR BLD AUTO: 0.2 % — SIGNIFICANT CHANGE UP (ref 0–2)
BUN SERPL-MCNC: 11 MG/DL — SIGNIFICANT CHANGE UP (ref 8–20)
CALCIUM SERPL-MCNC: 9 MG/DL — SIGNIFICANT CHANGE UP (ref 8.6–10.2)
CHLORIDE SERPL-SCNC: 96 MMOL/L — LOW (ref 98–107)
CO2 SERPL-SCNC: 23 MMOL/L — SIGNIFICANT CHANGE UP (ref 22–29)
CREAT SERPL-MCNC: 0.71 MG/DL — SIGNIFICANT CHANGE UP (ref 0.5–1.3)
EOSINOPHIL # BLD AUTO: 0.1 K/UL — SIGNIFICANT CHANGE UP (ref 0–0.5)
EOSINOPHIL NFR BLD AUTO: 1.1 % — SIGNIFICANT CHANGE UP (ref 0–6)
GLUCOSE SERPL-MCNC: 85 MG/DL — SIGNIFICANT CHANGE UP (ref 70–115)
HCT VFR BLD CALC: 35.8 % — LOW (ref 37–47)
HGB BLD-MCNC: 11.9 G/DL — LOW (ref 12–16)
LYMPHOCYTES # BLD AUTO: 1.1 K/UL — SIGNIFICANT CHANGE UP (ref 1–4.8)
LYMPHOCYTES # BLD AUTO: 8.9 % — LOW (ref 20–55)
MAGNESIUM SERPL-MCNC: 1.6 MG/DL — SIGNIFICANT CHANGE UP (ref 1.6–2.6)
MCHC RBC-ENTMCNC: 30.7 PG — SIGNIFICANT CHANGE UP (ref 27–31)
MCHC RBC-ENTMCNC: 33.2 G/DL — SIGNIFICANT CHANGE UP (ref 32–36)
MCV RBC AUTO: 92.5 FL — SIGNIFICANT CHANGE UP (ref 81–99)
MONOCYTES # BLD AUTO: 1 K/UL — HIGH (ref 0–0.8)
MONOCYTES NFR BLD AUTO: 7.8 % — SIGNIFICANT CHANGE UP (ref 3–10)
NEUTROPHILS # BLD AUTO: 10.1 K/UL — HIGH (ref 1.8–8)
NEUTROPHILS NFR BLD AUTO: 81.8 % — HIGH (ref 37–73)
PHOSPHATE SERPL-MCNC: 2.3 MG/DL — LOW (ref 2.4–4.7)
PLATELET # BLD AUTO: 274 K/UL — SIGNIFICANT CHANGE UP (ref 150–400)
POTASSIUM SERPL-MCNC: 4 MMOL/L — SIGNIFICANT CHANGE UP (ref 3.5–5.3)
POTASSIUM SERPL-SCNC: 4 MMOL/L — SIGNIFICANT CHANGE UP (ref 3.5–5.3)
RBC # BLD: 3.87 M/UL — LOW (ref 4.4–5.2)
RBC # FLD: 15.6 % — SIGNIFICANT CHANGE UP (ref 11–15.6)
SODIUM SERPL-SCNC: 136 MMOL/L — SIGNIFICANT CHANGE UP (ref 135–145)
WBC # BLD: 12.3 K/UL — HIGH (ref 4.8–10.8)
WBC # FLD AUTO: 12.3 K/UL — HIGH (ref 4.8–10.8)

## 2017-09-15 PROCEDURE — 99233 SBSQ HOSP IP/OBS HIGH 50: CPT

## 2017-09-15 RX ORDER — MAGNESIUM OXIDE 400 MG ORAL TABLET 241.3 MG
400 TABLET ORAL
Qty: 0 | Refills: 0 | Status: DISCONTINUED | OUTPATIENT
Start: 2017-09-15 | End: 2017-09-17

## 2017-09-15 RX ADMIN — SODIUM CHLORIDE 80 MILLILITER(S): 9 INJECTION, SOLUTION INTRAVENOUS at 05:49

## 2017-09-15 RX ADMIN — MAGNESIUM OXIDE 400 MG ORAL TABLET 400 MILLIGRAM(S): 241.3 TABLET ORAL at 17:19

## 2017-09-15 RX ADMIN — Medication 100 MILLIGRAM(S): at 05:49

## 2017-09-15 RX ADMIN — ENOXAPARIN SODIUM 40 MILLIGRAM(S): 100 INJECTION SUBCUTANEOUS at 15:23

## 2017-09-15 RX ADMIN — Medication 100 MILLIGRAM(S): at 15:23

## 2017-09-15 RX ADMIN — Medication 100 MILLIGRAM(S): at 05:50

## 2017-09-15 NOTE — PROGRESS NOTE ADULT - SUBJECTIVE AND OBJECTIVE BOX
Patient: MARITA LY [id number] 86y Female                 Internal Medicine Hospitalist Progress Note - Dr. Den Shelton    Chief Complaint: Patient is a 86y old  Female who presents with a chief complaint of Fall (13 Sep 2017 13:10)    HPI:  86 year old  female with a PMH of Diverticula of the colon, Macular degeneration, Dementia, and arthritis arrived in the ED complaining left hip pain. She lives at a assisted living home where she was found on the ground after a fall.   X-ray showed a L femoral neck fracture on her left side.   She underwent ORIF on 9/14.      Seen with daughter and son in law at bedside.  Pt offers no compliants, pain controlled.  No urinary complaints.  No fever/  chills.  They reports she is at baseline.     ___________________PHYSICAL EXAM:  Vitals reviewed as indicated below  GENERAL:  NAD, Alert and Oriented x 1 to person.  Slightly confused, pleasant.   HEENT: NCAT  CARDIOVASCULAR:  S1, S2  LUNGS: CTAB  ABDOMEN:  soft, (-) tenderness, (-) distension, (+) bowel sounds, (-) guarding, (-) rebound (-) rigidity  EXTREMITIES:  no cyanosis / clubbing / edema.   ____________________    BACKGROUND:  HEALTH ISSUES - PROBLEM Dx:  Chronic diastolic (congestive) heart failure: Chronic diastolic (congestive) heart failure  Acute cystitis without hematuria: Acute cystitis without hematuria  Late onset Alzheimer's disease without behavioral disturbance: Late onset Alzheimer&#x27;s disease without behavioral disturbance  Localized osteoporosis, unspecified pathological fracture presence: Localized osteoporosis, unspecified pathological fracture presence  Dementia without behavioral disturbance, unspecified dementia type: Dementia without behavioral disturbance, unspecified dementia type  Diverticulosis of large intestine without hemorrhage: Diverticulosis of large intestine without hemorrhage  Macular degeneration: Macular degeneration  Arthritis: Arthritis  Closed fracture of neck of left femur, initial encounter: Closed fracture of neck of left femur, initial encounter  Urinary tract infection, site unspecified: Urinary tract infection, site unspecified        Allergies    No Known Allergies    Intolerances      PAST MEDICAL & SURGICAL HISTORY:  Late onset Alzheimer's disease without behavioral disturbance  Diverticula of colon  Macular degeneration  Arthritis  No significant past surgical history      VITALS:  Vital Signs Last 24 Hrs  T(C): 36.6 (15 Sep 2017 10:22), Max: 37.5 (14 Sep 2017 15:27)  T(F): 97.8 (15 Sep 2017 10:22), Max: 99.5 (14 Sep 2017 15:27)  HR: 85 (15 Sep 2017 10:22) (67 - 89)  BP: 154/86 (15 Sep 2017 10:22) (110/86 - 167/72)  BP(mean): --  RR: 18 (15 Sep 2017 10:22) (15 - 20)  SpO2: 98% (15 Sep 2017 10:22) (83% - 100%) Daily     Daily   CAPILLARY BLOOD GLUCOSE        I&O's Summary      LABS:                        12.2   12.8  )-----------( 314      ( 14 Sep 2017 06:07 )             36.0     09-15    136  |  96<L>  |  11.0  ----------------------------<  85  4.0   |  23.0  |  0.71    Ca    9.0      15 Sep 2017 07:14  Phos  2.3     09-15  Mg     1.6     09-15                  MEDICATIONS:  MEDICATIONS  (STANDING):  enoxaparin Injectable 40 milliGRAM(s) SubCutaneous every 24 hours  docusate sodium 100 milliGRAM(s) Oral three times a day    MEDICATIONS  (PRN):  acetaminophen   Tablet. 650 milliGRAM(s) Oral every 6 hours PRN Mild Pain (1 - 3)  oxyCODONE    IR 10 milliGRAM(s) Oral every 4 hours PRN Moderate Pain  morphine  - Injectable 2 milliGRAM(s) IV Push every 4 hours PRN Severe Pain (7 - 10) breakthrough  aluminum hydroxide/magnesium hydroxide/simethicone Suspension 30 milliLiter(s) Oral four times a day PRN Indigestion  ondansetron Injectable 4 milliGRAM(s) IV Push every 6 hours PRN Nausea and/or Vomiting  magnesium hydroxide Suspension 30 milliLiter(s) Oral daily PRN Constipation

## 2017-09-15 NOTE — PROGRESS NOTE ADULT - SUBJECTIVE AND OBJECTIVE BOX
MARITA LY  [id number]        Chief Complaint: f/u hip fracture      Subjective: no cp/sob/palps      enoxaparin Injectable 40 milliGRAM(s) SubCutaneous every 24 hours  lactated ringers. 1000 milliLiter(s) IV Continuous <Continuous>  acetaminophen   Tablet. 650 milliGRAM(s) Oral every 6 hours PRN  oxyCODONE    IR 10 milliGRAM(s) Oral every 4 hours PRN  morphine  - Injectable 2 milliGRAM(s) IV Push every 4 hours PRN  aluminum hydroxide/magnesium hydroxide/simethicone Suspension 30 milliLiter(s) Oral four times a day PRN  ondansetron Injectable 4 milliGRAM(s) IV Push every 6 hours PRN  magnesium hydroxide Suspension 30 milliLiter(s) Oral daily PRN  docusate sodium 100 milliGRAM(s) Oral three times a day          Physical Exam:  T(C): 36.7 (09-15-17 @ 05:44), Max: 37.5 (09-14-17 @ 15:27)  HR: 83 (09-15-17 @ 05:44) (67 - 89)  BP: 146/85 (09-15-17 @ 05:44) (110/86 - 167/72)  RR: 18 (09-15-17 @ 05:44) (15 - 21)  SpO2: 96% (09-15-17 @ 05:44) (83% - 100%)  Wt(kg): --  General: Comfortable in NAD  HEENT: MMM, sclera anicteric  Resp: CTA bilaterally  CVS: nl s1s2, rrr, no s3/JVD  Abd: soft, NT, ND, BS+  Ext: No c/c/e  Neuro A&O x3  Psych: Normal affect    I&O's Summary        Labs:   15 Sep 2017 07:14    136    |  96     |  11.0   ----------------------------<  85     4.0     |  23.0   |  0.71     Ca    9.0        15 Sep 2017 07:14  Phos  2.3       15 Sep 2017 07:14  Mg     1.6       15 Sep 2017 07:14                            12.2   12.8  )-----------( 314      ( 14 Sep 2017 06:07 )             36.0         ECHO Summary:   1. Technically good study.   2. There is moderate septal left ventricular hypertrophy.   3. Normal global left ventricular systolic function.   4. Left ventricular ejection fraction, by visual estimation, is 60 to   65%.   5. Spectral Doppler shows pseudonormal pattern of left ventricular   myocardial filling (Grade II diastolic dysfunction).   6. Elevated left atrial and left ventricular end-diastolic pressures,   estimated at 25 mmHg.   7. Severely enlarged left atrium.   8. Thickening and calcification of the anterior and posterior mitral   valve leaflets.   9. Sclerotic aortic valve with normal opening.  10. Estimated pulmonary artery systolic pressure is 38.6 mmHg assuming a   right atrial pressure of 5 mmHg, which is consistent with borderline   pulmonary hypertension.  11. Trivial pericardial effusion.  12. Decreased mobility of mitral valve leaflets. Degree of mitral   stenosis can not be quantified based on the available images but severe   mitral stenosis is unlikely. Please repeat limited study for further   evaluation.      Assessment:  86 year old female with a PMH of Diverticula of the colon, Macular degeneration, Dementia, and arthritis arrived in the ED complaining left hip pain.  Patient with apparent unwitnessed fall, patient denies syncope, reports mechanical but has some confusion  No c/o now.  States she normally walks without limitations.    -Has aortic valve sclerosis but NO AS, perhaps mild MS, Mild PAH.  -S/p pinning, tolerates well without complication      Plan:  1.  CV stable  2. No further cardiac work up  3. Continue current meds  4.  Will sign off, call if questions, thanks!

## 2017-09-15 NOTE — PROGRESS NOTE ADULT - SUBJECTIVE AND OBJECTIVE BOX
Orthopedic PA Progress Note  Patient s/p Left CRPP POD #1    Patient in bed comfortable, no complaints  Left leg dressing C/D/I  calf soft NT  Niya/Plantar flexion intact   pulse intact    A/P Left hip CRPP  1. DVTP Lov 40 qd  2. WBAT   3. Pain Control

## 2017-09-15 NOTE — PROGRESS NOTE ADULT - SUBJECTIVE AND OBJECTIVE BOX
Pt seen, chart reviewed.  S/p Left Hip Pinning, POD#1.  VSS.  Adequate pain control.  Resting comfortably.   Tolerating PO intake.  No N/V.    No anesthesia problems noted.

## 2017-09-16 PROCEDURE — 99233 SBSQ HOSP IP/OBS HIGH 50: CPT

## 2017-09-16 RX ADMIN — Medication 100 MILLIGRAM(S): at 05:49

## 2017-09-16 RX ADMIN — OXYCODONE HYDROCHLORIDE 10 MILLIGRAM(S): 5 TABLET ORAL at 15:46

## 2017-09-16 RX ADMIN — Medication 650 MILLIGRAM(S): at 22:30

## 2017-09-16 RX ADMIN — OXYCODONE HYDROCHLORIDE 10 MILLIGRAM(S): 5 TABLET ORAL at 16:47

## 2017-09-16 RX ADMIN — MAGNESIUM OXIDE 400 MG ORAL TABLET 400 MILLIGRAM(S): 241.3 TABLET ORAL at 16:38

## 2017-09-16 RX ADMIN — Medication 100 MILLIGRAM(S): at 21:54

## 2017-09-16 RX ADMIN — MAGNESIUM OXIDE 400 MG ORAL TABLET 400 MILLIGRAM(S): 241.3 TABLET ORAL at 12:54

## 2017-09-16 RX ADMIN — MAGNESIUM OXIDE 400 MG ORAL TABLET 400 MILLIGRAM(S): 241.3 TABLET ORAL at 08:32

## 2017-09-16 RX ADMIN — Medication 100 MILLIGRAM(S): at 12:54

## 2017-09-16 RX ADMIN — Medication 650 MILLIGRAM(S): at 21:54

## 2017-09-16 RX ADMIN — ENOXAPARIN SODIUM 40 MILLIGRAM(S): 100 INJECTION SUBCUTANEOUS at 12:54

## 2017-09-16 NOTE — PROGRESS NOTE ADULT - SUBJECTIVE AND OBJECTIVE BOX
pt seen and examined s/p left CRPP, seen with her daughters at bedside. pt admits to "soreness" at surgical site otherwise states pain controlled. participating in physical therapy although states apprehensive to ambulate.  Denies CP, SOB, LE N/T.    Vital Signs Last 24 Hrs  T(C): 36.9 (16 Sep 2017 04:57), Max: 37.2 (15 Sep 2017 16:26)  T(F): 98.4 (16 Sep 2017 04:57), Max: 99 (15 Sep 2017 16:26)  HR: 82 (16 Sep 2017 05:30) (80 - 105)  BP: 154/80 (16 Sep 2017 05:30) (144/88 - 163/80)  BP(mean): --  RR: 18 (16 Sep 2017 04:57) (18 - 20)  SpO2: 98% (16 Sep 2017 04:57) (96% - 98%)                          11.9   12.3  )-----------( 274      ( 15 Sep 2017 07:14 )             35.8     PE: hx dementia but alert, responding and answering questions appropriately  LLE: no dressing to surgical site noted, pt removed dressing. incision C/D/I, no drainage, no significant swelling, new dressing applied and advised pt to not remove till instructed to due so  gross motor intact: + dorsi/plantar, EHL, FHL  gross sensation intact to light touch  pulses appreciated    A/P 87 y/o female s/p left hip CRPP POD # 2, overall pt doing well, educated pt and family on post op course and follow up    PT/OT: encourage OOB and ambulation, WBAT with walker  pain control  DVT PPX: Lovenox 40mg, SCD's  ortho stable  continue care with primary team pt seen and examined s/p left CRPP, seen with her daughters at bedside. pt admits to "soreness" at surgical site otherwise states pain controlled. participating in physical therapy although states apprehensive to ambulate.  Denies CP, SOB, LE N/T.    Vital Signs Last 24 Hrs  T(C): 36.9 (16 Sep 2017 04:57), Max: 37.2 (15 Sep 2017 16:26)  T(F): 98.4 (16 Sep 2017 04:57), Max: 99 (15 Sep 2017 16:26)  HR: 82 (16 Sep 2017 05:30) (80 - 105)  BP: 154/80 (16 Sep 2017 05:30) (144/88 - 163/80)  BP(mean): --  RR: 18 (16 Sep 2017 04:57) (18 - 20)  SpO2: 98% (16 Sep 2017 04:57) (96% - 98%)                          11.9   12.3  )-----------( 274      ( 15 Sep 2017 07:14 )             35.8     PE: hx dementia but alert, responding and answering questions appropriately  LLE: no dressing to surgical site noted, pt removed dressing. incision C/D/I, no drainage, no significant swelling, new dressing applied and advised pt to not remove till instructed to due so  gross motor intact: + dorsi/plantar, EHL, FHL  gross sensation intact to light touch  pulses appreciated    A/P 85 y/o female s/p left hip CRPP POD # 2, overall pt doing well, educated pt and family on post op course and follow up    PT/OT: encourage OOB and ambulation, WBAT with walker  pain control  DVT PPX: Lovenox 40mg,  Dischar  	    ortho stable  continue care with primary team

## 2017-09-16 NOTE — PROGRESS NOTE ADULT - SUBJECTIVE AND OBJECTIVE BOX
Patient: MARITA LY [id number] 86y Female                 Internal Medicine Hospitalist Progress Note - Dr. Den Shelton    Chief Complaint: Patient is a 86y old  Female who presents with a chief complaint of Fall (13 Sep 2017 13:10)    HPI:  86 year old  female with a PMH of Diverticula of the colon, Macular degeneration, Dementia, and arthritis arrived in the ED complaining left hip pain. She lives at a assisted living home where she was found on the ground after a fall.   X-ray showed a L femoral neck fracture on her left side.   She underwent ORIF on 9/14.      Seen with daughter at bedside.  Pt offers no compliants, pain controlled.  No urinary complaints.  No fever/  chills.      ___________________PHYSICAL EXAM:  Vitals reviewed as indicated below  GENERAL:  NAD, Alert and Oriented x 1 to person.  Slightly confused, pleasant.   HEENT: NCAT  CARDIOVASCULAR:  S1, S2  LUNGS: CTAB  ABDOMEN:  soft, (-) tenderness, (-) distension, (+) bowel sounds, (-) guarding, (-) rebound (-) rigidity  EXTREMITIES:  no cyanosis / clubbing / edema.   ____________________    VITALS:  Vital Signs Last 24 Hrs  T(C): 36.9 (16 Sep 2017 04:57), Max: 37.2 (15 Sep 2017 16:26)  T(F): 98.4 (16 Sep 2017 04:57), Max: 99 (15 Sep 2017 16:26)  HR: 82 (16 Sep 2017 05:30) (80 - 105)  BP: 154/80 (16 Sep 2017 05:30) (144/88 - 163/80)  BP(mean): --  RR: 18 (16 Sep 2017 04:57) (18 - 20)  SpO2: 98% (16 Sep 2017 04:57) (96% - 98%) Daily     Daily   CAPILLARY BLOOD GLUCOSE        I&O's Summary    15 Sep 2017 07:01  -  16 Sep 2017 07:00  --------------------------------------------------------  IN: 240 mL / OUT: 0 mL / NET: 240 mL        LABS:                        11.9   12.3  )-----------( 274      ( 15 Sep 2017 07:14 )             35.8     09-15    136  |  96<L>  |  11.0  ----------------------------<  85  4.0   |  23.0  |  0.71    Ca    9.0      15 Sep 2017 07:14  Phos  2.3     09-15  Mg     1.6     09-15                  MEDICATIONS:  enoxaparin Injectable 40 milliGRAM(s) SubCutaneous every 24 hours  acetaminophen   Tablet. 650 milliGRAM(s) Oral every 6 hours PRN  oxyCODONE    IR 10 milliGRAM(s) Oral every 4 hours PRN  morphine  - Injectable 2 milliGRAM(s) IV Push every 4 hours PRN  aluminum hydroxide/magnesium hydroxide/simethicone Suspension 30 milliLiter(s) Oral four times a day PRN  ondansetron Injectable 4 milliGRAM(s) IV Push every 6 hours PRN  magnesium hydroxide Suspension 30 milliLiter(s) Oral daily PRN  bisacodyl Suppository 10 milliGRAM(s) Rectal daily PRN  docusate sodium 100 milliGRAM(s) Oral three times a day  magnesium oxide 400 milliGRAM(s) Oral three times a day with meals

## 2017-09-16 NOTE — PHYSICAL THERAPY INITIAL EVALUATION ADULT - CRITERIA FOR SKILLED THERAPEUTIC INTERVENTIONS
impairments found/functional limitations in following categories/therapy frequency/anticipated discharge recommendation/rehab potential/anticipated equipment needs at discharge

## 2017-09-17 LAB
ANION GAP SERPL CALC-SCNC: 12 MMOL/L — SIGNIFICANT CHANGE UP (ref 5–17)
BUN SERPL-MCNC: 27 MG/DL — HIGH (ref 8–20)
C DIFF BY PCR RESULT: SIGNIFICANT CHANGE UP
C DIFF TOX GENS STL QL NAA+PROBE: SIGNIFICANT CHANGE UP
CALCIUM SERPL-MCNC: 8.8 MG/DL — SIGNIFICANT CHANGE UP (ref 8.6–10.2)
CHLORIDE SERPL-SCNC: 99 MMOL/L — SIGNIFICANT CHANGE UP (ref 98–107)
CO2 SERPL-SCNC: 26 MMOL/L — SIGNIFICANT CHANGE UP (ref 22–29)
CREAT SERPL-MCNC: 0.85 MG/DL — SIGNIFICANT CHANGE UP (ref 0.5–1.3)
GLUCOSE SERPL-MCNC: 88 MG/DL — SIGNIFICANT CHANGE UP (ref 70–115)
HCT VFR BLD CALC: 33.5 % — LOW (ref 37–47)
HGB BLD-MCNC: 11.4 G/DL — LOW (ref 12–16)
MAGNESIUM SERPL-MCNC: 2.3 MG/DL — SIGNIFICANT CHANGE UP (ref 1.6–2.6)
MCHC RBC-ENTMCNC: 30.6 PG — SIGNIFICANT CHANGE UP (ref 27–31)
MCHC RBC-ENTMCNC: 34 G/DL — SIGNIFICANT CHANGE UP (ref 32–36)
MCV RBC AUTO: 90.1 FL — SIGNIFICANT CHANGE UP (ref 81–99)
PLATELET # BLD AUTO: 310 K/UL — SIGNIFICANT CHANGE UP (ref 150–400)
POTASSIUM SERPL-MCNC: 3.8 MMOL/L — SIGNIFICANT CHANGE UP (ref 3.5–5.3)
POTASSIUM SERPL-SCNC: 3.8 MMOL/L — SIGNIFICANT CHANGE UP (ref 3.5–5.3)
RBC # BLD: 3.72 M/UL — LOW (ref 4.4–5.2)
RBC # FLD: 14.9 % — SIGNIFICANT CHANGE UP (ref 11–15.6)
SODIUM SERPL-SCNC: 137 MMOL/L — SIGNIFICANT CHANGE UP (ref 135–145)
WBC # BLD: 8.3 K/UL — SIGNIFICANT CHANGE UP (ref 4.8–10.8)
WBC # FLD AUTO: 8.3 K/UL — SIGNIFICANT CHANGE UP (ref 4.8–10.8)

## 2017-09-17 PROCEDURE — 99232 SBSQ HOSP IP/OBS MODERATE 35: CPT

## 2017-09-17 RX ADMIN — Medication 100 MILLIGRAM(S): at 12:02

## 2017-09-17 RX ADMIN — Medication 650 MILLIGRAM(S): at 12:02

## 2017-09-17 RX ADMIN — Medication 650 MILLIGRAM(S): at 13:02

## 2017-09-17 RX ADMIN — Medication 100 MILLIGRAM(S): at 21:56

## 2017-09-17 RX ADMIN — Medication 100 MILLIGRAM(S): at 05:55

## 2017-09-17 RX ADMIN — Medication 650 MILLIGRAM(S): at 20:57

## 2017-09-17 RX ADMIN — MAGNESIUM OXIDE 400 MG ORAL TABLET 400 MILLIGRAM(S): 241.3 TABLET ORAL at 07:48

## 2017-09-17 RX ADMIN — Medication 650 MILLIGRAM(S): at 19:57

## 2017-09-17 RX ADMIN — ENOXAPARIN SODIUM 40 MILLIGRAM(S): 100 INJECTION SUBCUTANEOUS at 12:02

## 2017-09-17 NOTE — PROGRESS NOTE ADULT - PROBLEM SELECTOR PROBLEM 1
Closed fracture of neck of left femur, initial encounter
Closed fracture of neck of left femur, initial encounter
Urinary tract infection, site unspecified

## 2017-09-17 NOTE — PROGRESS NOTE ADULT - SUBJECTIVE AND OBJECTIVE BOX
Patient: MARITA LY [id number] 86y Female                 Internal Medicine Hospitalist Progress Note - Dr. Den Shelton    Chief Complaint: Patient is a 86y old  Female who presents with a chief complaint of Fall (13 Sep 2017 13:10)    HPI:  86 year old  female with a PMH of Diverticula of the colon, Macular degeneration, Dementia, and arthritis arrived in the ED complaining left hip pain. She lives at a assisted living home where she was found on the ground after a fall.   X-ray showed a L femoral neck fracture on her left side.   She underwent ORIF on 9/14.      Pt offers no compliants, pain controlled.  No urinary complaints.  No fever/  chills.      ___________________PHYSICAL EXAM:  Vitals reviewed as indicated below  GENERAL:  NAD, Alert and Oriented x 1 to person.  Slightly confused, pleasant.   HEENT: NCAT  CARDIOVASCULAR:  S1, S2  LUNGS: CTAB  ABDOMEN:  soft, (-) tenderness, (-) distension, (+) bowel sounds, (-) guarding, (-) rebound (-) rigidity  EXTREMITIES:  no cyanosis / clubbing / edema.   ____________________    VITALS:  Vital Signs Last 24 Hrs  T(C): 36.7 (17 Sep 2017 08:59), Max: 37.2 (16 Sep 2017 21:55)  T(F): 98.1 (17 Sep 2017 08:59), Max: 99 (17 Sep 2017 04:00)  HR: 75 (17 Sep 2017 08:59) (69 - 99)  BP: 129/71 (17 Sep 2017 08:59) (127/84 - 155/77)  BP(mean): --  RR: 18 (17 Sep 2017 08:59) (18 - 19)  SpO2: 97% (17 Sep 2017 08:59) (96% - 97%) Daily     Daily   CAPILLARY BLOOD GLUCOSE        I&O's Summary    16 Sep 2017 07:01  -  17 Sep 2017 07:00  --------------------------------------------------------  IN: 0 mL / OUT: 200 mL / NET: -200 mL        LABS:                        11.4   8.3   )-----------( 310      ( 17 Sep 2017 06:05 )             33.5     09-17    137  |  99  |  27.0<H>  ----------------------------<  88  3.8   |  26.0  |  0.85    Ca    8.8      17 Sep 2017 06:05  Mg     2.3     09-17                  MEDICATIONS:  enoxaparin Injectable 40 milliGRAM(s) SubCutaneous every 24 hours  acetaminophen   Tablet. 650 milliGRAM(s) Oral every 6 hours PRN  oxyCODONE    IR 10 milliGRAM(s) Oral every 4 hours PRN  morphine  - Injectable 2 milliGRAM(s) IV Push every 4 hours PRN  aluminum hydroxide/magnesium hydroxide/simethicone Suspension 30 milliLiter(s) Oral four times a day PRN  ondansetron Injectable 4 milliGRAM(s) IV Push every 6 hours PRN  magnesium hydroxide Suspension 30 milliLiter(s) Oral daily PRN  bisacodyl Suppository 10 milliGRAM(s) Rectal daily PRN  docusate sodium 100 milliGRAM(s) Oral three times a day  magnesium oxide 400 milliGRAM(s) Oral three times a day with meals

## 2017-09-17 NOTE — PROGRESS NOTE ADULT - SUBJECTIVE AND OBJECTIVE BOX
Ortho Post Op Check    Name: MARITA LY    MR #: [id number]    Procedure: CRPP Right hip  Surgeon: Dr Fagan    PAST MEDICAL & SURGICAL HISTORY:  Late onset Alzheimer's disease without behavioral disturbance  Diverticula of colon  Macular degeneration  Arthritis  No significant past surgical history      Pt comfortable without complaints, pain controlled  Denies CP, SOB, N/V, numbness/tingling     General Exam:  Vital Signs Last 24 Hrs  T(C): 36.7 (09-17-17 @ 08:59), Max: 37.2 (09-17-17 @ 04:00)  T(F): 98.1 (09-17-17 @ 08:59), Max: 99 (09-17-17 @ 04:00)  HR: 75 (09-17-17 @ 08:59) (69 - 75)  BP: 129/71 (09-17-17 @ 08:59) (129/71 - 155/77)  BP(mean): --  RR: 18 (09-17-17 @ 08:59) (18 - 18)  SpO2: 97% (09-17-17 @ 08:59) (97% - 97%)    General: Pt Alert and oriented today, NAD, controlled pain.  Dressings C/D/I. No bleeding. Ecchymosis noted.   Pulses: 2+ dorsalis pedis pulse. Cap refill < 2 sec.  Sensation: Grossly intact to light touch without deficit.  Motor: + EHL/FHL/TA/GS                          11.4   8.3   )-----------( 310      ( 17 Sep 2017 06:05 )             33.5   17 Sep 2017 06:05    137    |  99     |  27.0   ----------------------------<  88     3.8     |  26.0   |  0.85     Ca    8.8        17 Sep 2017 06:05  Mg     2.3       17 Sep 2017 06:05    MEDICATIONS  (STANDING):  enoxaparin Injectable 40 milliGRAM(s) SubCutaneous every 24 hours  docusate sodium 100 milliGRAM(s) Oral three times a day  magnesium oxide 400 milliGRAM(s) Oral three times a day with meals    MEDICATIONS  (PRN):  acetaminophen   Tablet. 650 milliGRAM(s) Oral every 6 hours PRN Mild Pain (1 - 3)  oxyCODONE    IR 10 milliGRAM(s) Oral every 4 hours PRN Moderate Pain  morphine  - Injectable 2 milliGRAM(s) IV Push every 4 hours PRN Severe Pain (7 - 10) breakthrough  aluminum hydroxide/magnesium hydroxide/simethicone Suspension 30 milliLiter(s) Oral four times a day PRN Indigestion  ondansetron Injectable 4 milliGRAM(s) IV Push every 6 hours PRN Nausea and/or Vomiting  magnesium hydroxide Suspension 30 milliLiter(s) Oral daily PRN Constipation  bisacodyl Suppository 10 milliGRAM(s) Rectal daily PRN If no bowel movement      A/P: 86yFemale POD#3 s/p Right hip CRPP  - Stable  - Pain Control  - DVT ppx: Lovenox  - Weight bearing status: WBAT  - Ortho stable

## 2017-09-18 ENCOUNTER — INPATIENT (INPATIENT)
Facility: HOSPITAL | Age: 82
LOS: 15 days | Discharge: NURSING, REHAB OR LONG-TERM CARE | DRG: 560 | End: 2017-10-04
Attending: PHYSICAL MEDICINE & REHABILITATION | Admitting: PHYSICAL MEDICINE & REHABILITATION
Payer: MEDICARE

## 2017-09-18 VITALS
OXYGEN SATURATION: 93 % | RESPIRATION RATE: 18 BRPM | TEMPERATURE: 99 F | HEIGHT: 59 IN | WEIGHT: 111.77 LBS | HEART RATE: 88 BPM | DIASTOLIC BLOOD PRESSURE: 81 MMHG | SYSTOLIC BLOOD PRESSURE: 158 MMHG

## 2017-09-18 VITALS
RESPIRATION RATE: 20 BRPM | SYSTOLIC BLOOD PRESSURE: 131 MMHG | HEART RATE: 91 BPM | DIASTOLIC BLOOD PRESSURE: 74 MMHG | TEMPERATURE: 98 F

## 2017-09-18 DIAGNOSIS — Z51.89 ENCOUNTER FOR OTHER SPECIFIED AFTERCARE: ICD-10-CM

## 2017-09-18 PROCEDURE — 99222 1ST HOSP IP/OBS MODERATE 55: CPT | Mod: GC

## 2017-09-18 PROCEDURE — 99239 HOSP IP/OBS DSCHRG MGMT >30: CPT

## 2017-09-18 RX ORDER — ENOXAPARIN SODIUM 100 MG/ML
40 INJECTION SUBCUTANEOUS EVERY 24 HOURS
Qty: 0 | Refills: 0 | Status: DISCONTINUED | OUTPATIENT
Start: 2017-09-18 | End: 2017-10-04

## 2017-09-18 RX ORDER — OXYCODONE HYDROCHLORIDE 5 MG/1
5 TABLET ORAL EVERY 6 HOURS
Qty: 0 | Refills: 0 | Status: DISCONTINUED | OUTPATIENT
Start: 2017-09-18 | End: 2017-09-18

## 2017-09-18 RX ORDER — OXYCODONE HYDROCHLORIDE 5 MG/1
1 TABLET ORAL
Qty: 0 | Refills: 0 | COMMUNITY
Start: 2017-09-18

## 2017-09-18 RX ORDER — OXYCODONE HYDROCHLORIDE 5 MG/1
5 TABLET ORAL EVERY 6 HOURS
Qty: 0 | Refills: 0 | Status: DISCONTINUED | OUTPATIENT
Start: 2017-09-18 | End: 2017-09-20

## 2017-09-18 RX ORDER — PANTOPRAZOLE SODIUM 20 MG/1
40 TABLET, DELAYED RELEASE ORAL
Qty: 0 | Refills: 0 | Status: DISCONTINUED | OUTPATIENT
Start: 2017-09-18 | End: 2017-10-04

## 2017-09-18 RX ORDER — MAGNESIUM HYDROXIDE 400 MG/1
30 TABLET, CHEWABLE ORAL
Qty: 0 | Refills: 0 | COMMUNITY
Start: 2017-09-18

## 2017-09-18 RX ORDER — DOCUSATE SODIUM 100 MG
1 CAPSULE ORAL
Qty: 0 | Refills: 0 | COMMUNITY
Start: 2017-09-18

## 2017-09-18 RX ORDER — DOCUSATE SODIUM 100 MG
100 CAPSULE ORAL THREE TIMES A DAY
Qty: 0 | Refills: 0 | Status: DISCONTINUED | OUTPATIENT
Start: 2017-09-18 | End: 2017-09-25

## 2017-09-18 RX ORDER — ACETAMINOPHEN 500 MG
650 TABLET ORAL EVERY 6 HOURS
Qty: 0 | Refills: 0 | Status: DISCONTINUED | OUTPATIENT
Start: 2017-09-18 | End: 2017-10-04

## 2017-09-18 RX ORDER — MAGNESIUM HYDROXIDE 400 MG/1
30 TABLET, CHEWABLE ORAL DAILY
Qty: 0 | Refills: 0 | Status: DISCONTINUED | OUTPATIENT
Start: 2017-09-18 | End: 2017-09-30

## 2017-09-18 RX ORDER — ACETAMINOPHEN 500 MG
2 TABLET ORAL
Qty: 0 | Refills: 0 | COMMUNITY
Start: 2017-09-18

## 2017-09-18 RX ADMIN — Medication 650 MILLIGRAM(S): at 18:39

## 2017-09-18 RX ADMIN — Medication 650 MILLIGRAM(S): at 11:45

## 2017-09-18 RX ADMIN — Medication 100 MILLIGRAM(S): at 06:35

## 2017-09-18 RX ADMIN — Medication 650 MILLIGRAM(S): at 21:20

## 2017-09-18 RX ADMIN — Medication 650 MILLIGRAM(S): at 10:45

## 2017-09-18 RX ADMIN — Medication 650 MILLIGRAM(S): at 17:52

## 2017-09-18 RX ADMIN — Medication 650 MILLIGRAM(S): at 20:20

## 2017-09-18 RX ADMIN — ENOXAPARIN SODIUM 40 MILLIGRAM(S): 100 INJECTION SUBCUTANEOUS at 15:09

## 2017-09-18 RX ADMIN — Medication 650 MILLIGRAM(S): at 03:42

## 2017-09-18 RX ADMIN — Medication 100 MILLIGRAM(S): at 21:49

## 2017-09-18 RX ADMIN — Medication 650 MILLIGRAM(S): at 02:42

## 2017-09-18 NOTE — DISCHARGE NOTE ADULT - SECONDARY DIAGNOSIS.
Chronic diastolic (congestive) heart failure Late onset Alzheimer's disease without behavioral disturbance Localized osteoporosis, unspecified pathological fracture presence

## 2017-09-18 NOTE — DISCHARGE NOTE ADULT - PLAN OF CARE
stable for discharge It is important to see your primary physician as well as the physicians noted below within the next week to perform a comprehensive medical review.  Call their offices for an appointment as soon as you leave the hospital.  If you do not have a primary physician, contact the [facility] [place] [phone] located on [address] [place] [address].  Your medical issues appear to be stable at this time, but if your symptoms recur or worsen, contact your physicians and/or return to the hospital if necessary.  If you encounter any issues or questions with your medication, call your physicians before stopping the medication.  Do not drive.  Limit your diet to 2 grams of sodium daily.

## 2017-09-18 NOTE — DISCHARGE NOTE ADULT - MEDICATION SUMMARY - MEDICATIONS TO STOP TAKING
I will STOP taking the medications listed below when I get home from the hospital:    calcitonin 200 intl units/inh nasal spray  -- 1 spray(s) into nose once a day    cephalexin 500 mg oral tablet  -- 1 tab(s) by mouth 4 times a day   -- Finish all this medication unless otherwise directed by prescriber.

## 2017-09-18 NOTE — DISCHARGE NOTE ADULT - CARE PROVIDER_API CALL
Maxi Fagan), Orthopaedic Surgery  [address] [place] [address]  Phone: [phone]  Fax: [phone]

## 2017-09-18 NOTE — DISCHARGE NOTE ADULT - HOSPITAL COURSE
Patient: MARITA LY [id number]                 Internal Medicine Hospitalist Discharge Note - Dr. Den Shelton      Chief Complaint: Patient is a 86y old  Female who presents with a chief complaint of Fall (13 Sep 2017 13:10)    HPI:  86 year old  female with a PMH of Diverticula of the colon, Macular degeneration, Dementia, and arthritis arrived in the ED complaining left hip pain. She lives at a assisted living home where she was found on the ground after a fall.   X-ray showed a L femoral neck fracture on her left side.   She underwent ORIF on 9/14.      > L hip fracture s/p ORIF - pain controlled.   Pt eval appreciated.  Awaiting acute rehab placement.  d/w dtr [facility].    > Mild Dementia - stable.  Supportive care.  > Chronic diastolic CHF - clinically stable off diuretics.  Cardiology f/u appreciated.  > Asymptomatic bacteruria - Asymptomatic off Abx.  Conservative mgt.  > Hypomagnesemia - Now stable, within normal limits.  D/c Magox.       Disposition: stable for discharge.  Outpatient followup as above.  Patient was advised to return if they experience any recurrence or worsening of symptoms.  Total time spent on discharge was 35 minutes.  -Den Shelton D.O., Hospitalist, [facility]

## 2017-09-18 NOTE — PROGRESS NOTE ADULT - SUBJECTIVE AND OBJECTIVE BOX
Patient: MARITA LY [id number] 86y Female                 Internal Medicine Hospitalist Progress Note - Dr. Den Shelton    Chief Complaint: Patient is a 86y old  Female who presents with a chief complaint of Fall (13 Sep 2017 13:10)    HPI:  86 year old  female with a PMH of Diverticula of the colon, Macular degeneration, Dementia, and arthritis arrived in the ED complaining left hip pain. She lives at a assisted living home where she was found on the ground after a fall.   X-ray showed a L femoral neck fracture on her left side.   She underwent ORIF on 9/14.      Pt offers no compliants, pain controlled.  No fever/  chills.      ___________________PHYSICAL EXAM:  Vitals reviewed as indicated below  GENERAL:  NAD, Alert and Oriented x 1 to person.  Slightly confused, pleasant.   HEENT: NCAT  CARDIOVASCULAR:  S1, S2  LUNGS: CTAB  ABDOMEN:  soft, (-) tenderness, (-) distension, (+) bowel sounds, (-) guarding, (-) rebound (-) rigidity  EXTREMITIES:  no cyanosis / clubbing / edema.   ____________________    VITALS:  Vital Signs Last 24 Hrs  T(C): 36.2 (18 Sep 2017 03:54), Max: 36.9 (17 Sep 2017 21:57)  T(F): 97.1 (18 Sep 2017 03:54), Max: 98.4 (17 Sep 2017 21:57)  HR: 67 (18 Sep 2017 03:54) (67 - 82)  BP: 141/76 (18 Sep 2017 03:54) (129/67 - 141/76)  BP(mean): --  RR: 19 (18 Sep 2017 03:54) (18 - 20)  SpO2: 97% (18 Sep 2017 03:54) (96% - 97%) Daily     Daily   CAPILLARY BLOOD GLUCOSE        I&O's Summary    17 Sep 2017 07:01  -  18 Sep 2017 07:00  --------------------------------------------------------  IN: 0 mL / OUT: 300 mL / NET: -300 mL        LABS:                        11.4   8.3   )-----------( 310      ( 17 Sep 2017 06:05 )             33.5     09-17    137  |  99  |  27.0<H>  ----------------------------<  88  3.8   |  26.0  |  0.85    Ca    8.8      17 Sep 2017 06:05  Mg     2.3     09-17                  MEDICATIONS:  enoxaparin Injectable 40 milliGRAM(s) SubCutaneous every 24 hours  acetaminophen   Tablet. 650 milliGRAM(s) Oral every 6 hours PRN  oxyCODONE    IR 10 milliGRAM(s) Oral every 4 hours PRN  morphine  - Injectable 2 milliGRAM(s) IV Push every 4 hours PRN  aluminum hydroxide/magnesium hydroxide/simethicone Suspension 30 milliLiter(s) Oral four times a day PRN  ondansetron Injectable 4 milliGRAM(s) IV Push every 6 hours PRN  magnesium hydroxide Suspension 30 milliLiter(s) Oral daily PRN  bisacodyl Suppository 10 milliGRAM(s) Rectal daily PRN  docusate sodium 100 milliGRAM(s) Oral three times a day

## 2017-09-18 NOTE — PROGRESS NOTE ADULT - PROVIDER SPECIALTY LIST ADULT
Anesthesia
Anesthesia
Cardiology
Hospitalist
Orthopedics
Cardiology
Hospitalist
Orthopedics

## 2017-09-18 NOTE — PROGRESS NOTE ADULT - ASSESSMENT
> L hip fracture s/p ORIF - pain controlled.   Pt eval appreciated.  Awaiting PMR evaluation.  Discussed options of acute vs ARNULFO vs HHA at assisted living facility.    > Mild Dementia - stable.  Supportive care.  > Chronic diastolic CHF - clinically stable off diuretics.  Cardiology f/u appreciated.  > Asymptomatic bacteruria - Asymptomatic off Abx.  Conservative mgt.  > Hypomagnesemia - Now stable, within normal limits.  D/c Magox.   > DVT Prophylaxis - Lovenox subcut
86 year old female hip fracture, dementia, osteoporosis, elevated wbc
> L hip fracture s/p ORIF - pain controlled.   Awaiting PT evaluation.  Possible return to assisted living facility vs ARNULFO discussed.  Advance diet.  > Mild Dementia - stable.  Supportive care.  > Chronic diastolic CHF - clinically stable off diuretics.  Cardiology f/u appreciated.  > Asymptomatic bacteruria - Asymptomatic off Abx.  Conservative mgt.  > Hypomagnesemia - Supplement lytes.   > DVT Prophylaxis - Lovenox subcut
> L hip fracture s/p ORIF - pain controlled.   Pt eval appreciated.  Awaiting PMR evaluation.  Discussed options of acute vs ARNULFO vs HHA at assisted living facility.    > Mild Dementia - stable.  Supportive care.  > Chronic diastolic CHF - clinically stable off diuretics.  Cardiology f/u appreciated.  > Asymptomatic bacteruria - Asymptomatic off Abx.  Conservative mgt.  > Hypomagnesemia - repeat Mg level in am.    > DVT Prophylaxis - Lovenox subcut
> L hip fracture s/p ORIF - pain controlled.   Pt eval appreciated.  Awaiting acute rehab placement.  d/w dtr [facility].    > Mild Dementia - stable.  Supportive care.  > Chronic diastolic CHF - clinically stable off diuretics.  Cardiology f/u appreciated.  > Asymptomatic bacteruria - Asymptomatic off Abx.  Conservative mgt.  > Hypomagnesemia - Now stable, within normal limits.  D/c Magox.   > DVT Prophylaxis - Lovenox subcut    Medically stable for acute rehab
A 86 year old  female with a PMH of Diverticula of the colon, Macular degeneration, Dementia, and arthritis arrived in the ED complaining left hip pain.

## 2017-09-18 NOTE — DISCHARGE NOTE ADULT - MEDICATION SUMMARY - MEDICATIONS TO TAKE
I will START or STAY ON the medications listed below when I get home from the hospital:    acetaminophen 325 mg oral tablet  -- 2 tab(s) by mouth every 6 hours, As needed, Mild - moderate  Pain (1 - 3)  -- Indication: For Hip fracture    oxyCODONE 5 mg oral tablet  -- 1 tab(s) by mouth every 6 hours, As needed, Severe Pain (7 - 10)  -- Indication: For Hip fracture    magnesium hydroxide 8% oral suspension  -- 30 milliliter(s) by mouth once a day, As needed, Constipation  -- Indication: For Laxative    docusate sodium 100 mg oral capsule  -- 1 cap(s) by mouth 3 times a day  -- Indication: For Laxative    pantoprazole 40 mg oral delayed release tablet  -- 1 tab(s) by mouth once a day (before a meal)  -- Indication: For Gastritis    calcium (as carbonate)-vitamin D 250 mg-125 intl units oral tablet  -- 1 tab(s) by mouth 2 times a day  -- Indication: For Supplement

## 2017-09-18 NOTE — CONSULT NOTE ADULT - SUBJECTIVE AND OBJECTIVE BOX
CC: Rehab was called to evaluate a 86y old Female who presented s/p fall with closed left femur fracture.      HPI:  86 year old  female with a PMH of Diverticula of the colon, Macular degeneration, Dementia, and arthritis arrived in the ED on 9.12.17 complaining left hip pain s/p fall after getting out of bed. She currently lives at a assisted living home om [place] where she was found on the ground after a fall. She has never had this type of pain before and has difficulty walking. She is also complaining of mild abdominal pain in her lower quadrants in which she claims to be constipated. X-ray showed a femoral neck fracture on her left side. Orthopedics has been consulted and underwent a left hip perc pinning on 9/14/17 by Dr. Fagan. Pt also had a UTI and was treated with Rocephin. Patient was seen and examined at bedside. Pt had no acute events overnight. Pt rates her current hip pain 3-4/10 with movement. +BM and tolerating diet well. Pt has no other complaints      REVIEW OF SYSTEMS  Constitutional - No fever, No weight loss, No fatigue  HEENT - No eye pain, No visual disturbances, No difficulty hearing, + "wooziness"   Respiratory - No cough, No wheezing, No shortness of breath  Cardiovascular - No chest pain, No palpitations  Gastrointestinal - No abdominal pain, No nausea, No vomiting, No diarrhea, No constipation  Genitourinary - No dysuria, No frequency, No hematuria  Neurological - No headaches, + memory loss, No loss of strength, No numbness, No tremors  Skin - No itching, No rashes, No lesions   Musculoskeletal - + left hip joint pain, No joint swelling, + muscle pain  Psychiatric - No depression, No anxiety    PAST MEDICAL & SURGICAL HISTORY   Alzheimer's diseasee  Diverticula of colon  Macular degeneration  Arthritis  OA      SOCIAL HISTORY  Smoking - Denied  EtOH - Denied   Drugs - Denied    FUNCTIONAL HISTORY  Lives in Assisted Living in [place], no steps  Independent with no device    CURRENT FUNCTIONAL STATUS  mod assist, RW 5 feet, small shuffling steps   (9/16)    FAMILY HISTORY   No pertinent family history in first degree relatives      RECENT LABS/IMAGING  CBC Full  -  ( 17 Sep 2017 06:05 )  WBC Count : 8.3 K/uL  Hemoglobin : 11.4 g/dL  Hematocrit : 33.5 %  Platelet Count - Automated : 310 K/uL  Mean Cell Volume : 90.1 fl  Mean Cell Hemoglobin : 30.6 pg  Mean Cell Hemoglobin Concentration : 34.0 g/dL  Auto Neutrophil # : x  Auto Lymphocyte # : x  Auto Monocyte # : x  Auto Eosinophil # : x  Auto Basophil # : x  Auto Neutrophil % : x  Auto Lymphocyte % : x  Auto Monocyte % : x  Auto Eosinophil % : x  Auto Basophil % : x    09-17    137  |  99  |  27.0<H>  ----------------------------<  88  3.8   |  26.0  |  0.85    Ca    8.8      17 Sep 2017 06:05  Mg     2.3     09-17      VITALS  T(C): 36.2 (09-18-17 @ 03:54), Max: 36.9 (09-17-17 @ 21:57)  HR: 67 (09-18-17 @ 03:54) (67 - 82)  BP: 141/76 (09-18-17 @ 03:54) (129/67 - 141/76)  RR: 19 (09-18-17 @ 03:54) (18 - 20)  SpO2: 97% (09-18-17 @ 03:54) (96% - 97%)    ALLERGIES  No Known Allergies      MEDICATIONS   enoxaparin Injectable 40 milliGRAM(s) SubCutaneous every 24 hours  acetaminophen   Tablet. 650 milliGRAM(s) Oral every 6 hours PRN  oxyCODONE    IR 10 milliGRAM(s) Oral every 4 hours PRN  morphine  - Injectable 2 milliGRAM(s) IV Push every 4 hours PRN  aluminum hydroxide/magnesium hydroxide/simethicone Suspension 30 milliLiter(s) Oral four times a day PRN  ondansetron Injectable 4 milliGRAM(s) IV Push every 6 hours PRN  magnesium hydroxide Suspension 30 milliLiter(s) Oral daily PRN  bisacodyl Suppository 10 milliGRAM(s) Rectal daily PRN  docusate sodium 100 milliGRAM(s) Oral three times a day    < from: Xray Hip w/ Pelvis 2 or 3 Views, Left (09.14.17 @ 16:32) >     EXAM:  XR HIP WITH PELV 2-3V LT                          PROCEDURE DATE:  09/14/2017          INTERPRETATION:  X-RAY OF LEFT HIP.    History: Postoperative status. Status post ORIF left fracture.    Date and time of exam: 9/14/2017 4:19 PM.    Technique: 2 views were obtained.    Findings: Several screws transfix the left femoral neck. Alignment   appears to be essentially anatomic..    Impression:  Status post ORIF left femoral neck fracture..  WILVER MCGILL M.D., ATTENDING RADIOLOGIST  This document has been electronically signed. Sep 14 2017  4:47PM  ----------------------------------------------------------------------------------------  PHYSICAL EXAM  Constitutional - NAD, Comfortable  HEENT - NCAT, EOMI  Neck - Supple, No limited ROM  Chest - CTA bilaterally  Cardiovascular - RRR, S1S2  Abdomen - BS+, Soft, NTND  Extremities - No C/C/E No calf tenderness   Neurologic Exam -                    Cognitive - Awake, Alert, AAO to self, place, not year (believes its March/April and Edvin Espino is president)     Communication - Fluent, No dysarthria     Cranial Nerves - CN 2-12 intact     Motor - No focal deficits                    LEFT    UE - ShAB 5/5, EF 5/5, EE 5/5, WE 5/5,  3/5                    RIGHT UE - ShAB 5/5, EF 5/5, EE 5/5, WE 5/5,  4/5                    LEFT    LE - HF 3/5, KE 3/5, DF 5/5, PF 5/5                    RIGHT LE - HF 4/5, KE 5/5, DF 5/5, PF 5/5        Sensory - Intact to LT     Reflexes - DTR Intact     OculoVestibular - No saccades, No nystagmus, VOR      Psychiatric - Mood stable, Affect WNL  ----------------------------------------------------------------------------------------  ASSESSMENT/PLAN  86y old Female who presented s/p fall with closed left femur fracture with ORIF 9/14/17.  Rehab - Pt s/p left femur ORIF and still has some left LE functional deficits with hip flexion and knee flex/ext. Pt was fully independent prior to her fall and lives at assisted living in [place], [place]. At this time, once medically stable, will recommend ARNULFO vs DC to assisted living with home care and PT. CC: Rehab was called to evaluate a 86y old Female who presented s/p fall with closed left femur fracture.      HPI:  86 year old  female with a PMH of Diverticula of the colon, Macular degeneration, Dementia, and arthritis arrived in the ED on 9.12.17 complaining left hip pain s/p fall after getting out of bed. She currently lives at a assisted living home om [place] where she was found on the ground after a fall. She has never had this type of pain before and has difficulty walking. She is also complaining of mild abdominal pain in her lower quadrants in which she claims to be constipated. X-ray showed a femoral neck fracture on her left side. Orthopedics has been consulted and underwent a left hip perc pinning on 9/14/17 by Dr. Fagan. Pt also had a UTI and was treated with Rocephin. Patient was seen and examined at bedside. Pt had no acute events overnight. Pt rates her current hip pain 3-4/10 with movement. +BM and tolerating diet well. Pt has no other complaints      REVIEW OF SYSTEMS  Constitutional - No fever, No weight loss, No fatigue  HEENT - No eye pain, No visual disturbances, No difficulty hearing, + "wooziness"   Respiratory - No cough, No wheezing, No shortness of breath  Cardiovascular - No chest pain, No palpitations  Gastrointestinal - No abdominal pain, No nausea, No vomiting, No diarrhea, No constipation  Genitourinary - No dysuria, No frequency, No hematuria  Neurological - No headaches, + memory loss, No loss of strength, No numbness, No tremors  Skin - No itching, No rashes, No lesions   Musculoskeletal - + left hip joint pain, No joint swelling, + muscle pain  Psychiatric - No depression, No anxiety    PAST MEDICAL & SURGICAL HISTORY   Alzheimer's diseasee  Diverticula of colon  Macular degeneration  Arthritis  OA      SOCIAL HISTORY  Smoking - Denied  EtOH - Denied   Drugs - Denied    FUNCTIONAL HISTORY  Lives in Assisted Living in [place], no steps  Independent with no device    CURRENT FUNCTIONAL STATUS  mod assist, RW 5 feet, small shuffling steps   (9/16)    FAMILY HISTORY   No pertinent family history in first degree relatives      RECENT LABS/IMAGING  CBC Full  -  ( 17 Sep 2017 06:05 )  WBC Count : 8.3 K/uL  Hemoglobin : 11.4 g/dL  Hematocrit : 33.5 %  Platelet Count - Automated : 310 K/uL  Mean Cell Volume : 90.1 fl  Mean Cell Hemoglobin : 30.6 pg  Mean Cell Hemoglobin Concentration : 34.0 g/dL  Auto Neutrophil # : x  Auto Lymphocyte # : x  Auto Monocyte # : x  Auto Eosinophil # : x  Auto Basophil # : x  Auto Neutrophil % : x  Auto Lymphocyte % : x  Auto Monocyte % : x  Auto Eosinophil % : x  Auto Basophil % : x    09-17    137  |  99  |  27.0<H>  ----------------------------<  88  3.8   |  26.0  |  0.85    Ca    8.8      17 Sep 2017 06:05  Mg     2.3     09-17      VITALS  T(C): 36.2 (09-18-17 @ 03:54), Max: 36.9 (09-17-17 @ 21:57)  HR: 67 (09-18-17 @ 03:54) (67 - 82)  BP: 141/76 (09-18-17 @ 03:54) (129/67 - 141/76)  RR: 19 (09-18-17 @ 03:54) (18 - 20)  SpO2: 97% (09-18-17 @ 03:54) (96% - 97%)    ALLERGIES  No Known Allergies      MEDICATIONS   enoxaparin Injectable 40 milliGRAM(s) SubCutaneous every 24 hours  acetaminophen   Tablet. 650 milliGRAM(s) Oral every 6 hours PRN  oxyCODONE    IR 10 milliGRAM(s) Oral every 4 hours PRN  morphine  - Injectable 2 milliGRAM(s) IV Push every 4 hours PRN  aluminum hydroxide/magnesium hydroxide/simethicone Suspension 30 milliLiter(s) Oral four times a day PRN  ondansetron Injectable 4 milliGRAM(s) IV Push every 6 hours PRN  magnesium hydroxide Suspension 30 milliLiter(s) Oral daily PRN  bisacodyl Suppository 10 milliGRAM(s) Rectal daily PRN  docusate sodium 100 milliGRAM(s) Oral three times a day    < from: Xray Hip w/ Pelvis 2 or 3 Views, Left (09.14.17 @ 16:32) >     EXAM:  XR HIP WITH PELV 2-3V LT                          PROCEDURE DATE:  09/14/2017          INTERPRETATION:  X-RAY OF LEFT HIP.    History: Postoperative status. Status post ORIF left fracture.    Date and time of exam: 9/14/2017 4:19 PM.    Technique: 2 views were obtained.    Findings: Several screws transfix the left femoral neck. Alignment   appears to be essentially anatomic..    Impression:  Status post ORIF left femoral neck fracture..  WILVER MCGILL M.D., ATTENDING RADIOLOGIST  This document has been electronically signed. Sep 14 2017  4:47PM  ----------------------------------------------------------------------------------------  PHYSICAL EXAM  Constitutional - NAD, Comfortable  HEENT - NCAT, EOMI  Neck - Supple, No limited ROM  Chest - CTA bilaterally  Cardiovascular - RRR, S1S2  Abdomen - BS+, Soft, NTND  Extremities - No C/C/E No calf tenderness   Neurologic Exam -                    Cognitive - Awake, Alert, AAO to self, place, not year (believes its March/April and Edvin Espino is president)     Communication - Fluent, No dysarthria, repeats certain phrases      Cranial Nerves - CN 2-12 grossly intact     Motor - No focal deficits                    LEFT    UE - ShAB 5/5, EF 5/5, EE 5/5, WE 5/5,  3/5                    RIGHT UE - ShAB 5/5, EF 5/5, EE 5/5, WE 5/5,  4/5                    LEFT    LE - HF 3/5, KE 3/5, DF 5/5, PF 5/5                    RIGHT LE - HF 4/5, KE 5/5, DF 5/5, PF 5/5        Sensory - Intact to LT     Reflexes - DTR Intact     OculoVestibular - No saccades, No nystagmus, VOR      Psychiatric - Mood stable, Affect WNL  ----------------------------------------------------------------------------------------  ASSESSMENT/PLAN  86y old Female who presented s/p fall with closed left femur fracture with ORIF 9/14/17.  Rehab - Pt s/p left femur ORIF and still has some left LE functional deficits with hip flexion and knee flex/ext. Pt was fully independent prior to her fall and lives at assisted living in [place], [place]. Pt does have some cognitive deficits 2/2 to her Alzheimer's that would require more supervision for her injuries upon d/c. At this time, once medically stable, will recommend ACUTE inpatient rehabilitation for the functional deficits consisting of 3 hours of therapy/day & 24 hour RN/daily PMR physician for comorbid medical management. Will continue to follow for ongoing rehab needs and recommendations. CC: Rehab was called to evaluate a 86y old Female who presented s/p fall with closed left femur fracture.      HPI:  86 year old  female with a PMH of Diverticula of the colon, Macular degeneration, Dementia, and arthritis arrived in the ED on 9.12.17 complaining left hip pain s/p fall after getting out of bed. She currently lives at a assisted living home in [place] where she was found on the ground after a fall.  She is also complaining of mild abdominal pain in her lower quadrants in which she claims to be constipated.     X-ray showed a femoral neck fracture on her left side. Orthopedics consulted and underwent a left hip perc pinning on 9/14/17 by Dr. Fagan. Pt also had a UTI and was treated with Rocephin. Patient was seen and examined at bedside. Pt had no acute events overnight. Pt rates her current hip pain 3-4/10 with movement. +BM and tolerating diet well. Pt has no other complaints      REVIEW OF SYSTEMS  Constitutional - No fever,   HEENT - + "wooziness"   Respiratory - No cough,   Cardiovascular - No chest pain,   Gastrointestinal - No abdominal pain,   Genitourinary - No dysuria, No frequency, No hematuria  Neurological - No headaches, + memory loss, No loss of strength, No numbness, No tremors  Skin - No itching,  Musculoskeletal - + left hip joint pain, No joint swelling, + muscle pain  Psychiatric - No depression, No anxiety    PAST MEDICAL & SURGICAL HISTORY   Alzheimer's diseasee  Diverticula of colon  Macular degeneration  Arthritis  OA      SOCIAL HISTORY  Smoking - Denied  EtOH - Denied   Drugs - Denied    FUNCTIONAL HISTORY  Lives in Assisted Living in [place], no steps  Independent with no device    CURRENT FUNCTIONAL STATUS  mod assist, RW 5 feet, small shuffling steps   (9/16)    FAMILY HISTORY   No pertinent family history in first degree relatives      RECENT LABS/IMAGING  CBC Full  -  ( 17 Sep 2017 06:05 )  WBC Count : 8.3 K/uL  Hemoglobin : 11.4 g/dL  Hematocrit : 33.5 %  Platelet Count - Automated : 310 K/uL  Mean Cell Volume : 90.1 fl  Mean Cell Hemoglobin : 30.6 pg  Mean Cell Hemoglobin Concentration : 34.0 g/dL  Auto Neutrophil # : x  Auto Lymphocyte # : x  Auto Monocyte # : x  Auto Eosinophil # : x  Auto Basophil # : x  Auto Neutrophil % : x  Auto Lymphocyte % : x  Auto Monocyte % : x  Auto Eosinophil % : x  Auto Basophil % : x    09-17    137  |  99  |  27.0<H>  ----------------------------<  88  3.8   |  26.0  |  0.85    Ca    8.8      17 Sep 2017 06:05  Mg     2.3     09-17      ALLERGIES  No Known Allergies      MEDICATIONS   enoxaparin Injectable 40 milliGRAM(s) SubCutaneous every 24 hours  acetaminophen   Tablet. 650 milliGRAM(s) Oral every 6 hours PRN  oxyCODONE    IR 10 milliGRAM(s) Oral every 4 hours PRN  morphine  - Injectable 2 milliGRAM(s) IV Push every 4 hours PRN  aluminum hydroxide/magnesium hydroxide/simethicone Suspension 30 milliLiter(s) Oral four times a day PRN  ondansetron Injectable 4 milliGRAM(s) IV Push every 6 hours PRN  magnesium hydroxide Suspension 30 milliLiter(s) Oral daily PRN  bisacodyl Suppository 10 milliGRAM(s) Rectal daily PRN  docusate sodium 100 milliGRAM(s) Oral three times a day    < from: Xray Hip w/ Pelvis 2 or 3 Views, Left (09.14.17 @ 16:32) >     EXAM:  XR HIP WITH PELV 2-3V LT                          PROCEDURE DATE:  09/14/2017          INTERPRETATION:  X-RAY OF LEFT HIP.    History: Postoperative status. Status post ORIF left fracture.    Date and time of exam: 9/14/2017 4:19 PM.    Technique: 2 views were obtained.    Findings: Several screws transfix the left femoral neck. Alignment   appears to be essentially anatomic..    Impression:  Status post ORIF left femoral neck fracture..  WILVER MCGILL M.D., ATTENDING RADIOLOGIST  This document has been electronically signed. Sep 14 2017  4:47PM  ----------------------------------------------------------------------------------------    Vital Signs Last 24 Hrs  T(C): 36.2 (18 Sep 2017 03:54), Max: 36.9 (17 Sep 2017 21:57)  T(F): 97.1 (18 Sep 2017 03:54), Max: 98.4 (17 Sep 2017 21:57)  HR: 67 (18 Sep 2017 03:54) (67 - 82)  BP: 141/76 (18 Sep 2017 03:54) (129/67 - 141/76)  BP(mean): --  RR: 19 (18 Sep 2017 03:54) (18 - 20)  SpO2: 97% (18 Sep 2017 03:54) (96% - 97%)    PHYSICAL EXAM  Constitutional - NAD, Comfortable  Chest - CTA bilaterally  Cardiovascular - RRR, S1S2  Abdomen - BS+, Soft, NTND  Extremities - No C/C/E No calf tenderness   Neurologic Exam -                    Cognitive - Awake, Alert, AAO to self, place, not year (believes its March/April and Edvin Espino is president)     Communication - Fluent, No dysarthria, repeats certain phrases      Cranial Nerves - CN 2-12 grossly intact     Motor - No focal deficits                    LEFT    UE - ShAB 5/5, EF 5/5, EE 5/5, WE 5/5,  3/5                    RIGHT UE - ShAB 5/5, EF 5/5, EE 5/5, WE 5/5,  4/5                    LEFT    LE - HF 3/5, KE 3/5, DF 5/5, PF 5/5                    RIGHT LE - HF 4/5, KE 5/5, DF 5/5, PF 5/5        Sensory - Intact to LT    ----------------------------------------------------------------------------------------  ASSESSMENT/PLAN  86y old Female who presented s/p fall with closed left femur fracture with ORIF 9/14/17.  Rehab - Pt s/p left femur ORIF and still has some left LE functional deficits with hip flexion and knee flex/ext. Pt was fully independent prior to her fall and lives at assisted living in [place], [place]. Pt does have some cognitive deficits 2/2 to her Alzheimer's that would require more supervision..  At this time, once medically stable, will recommend ACUTE inpatient rehabilitation for the functional deficits consisting of 3 hours of therapy/day & 24 hour RN/daily PMR physician for comorbid medical management. Will continue to follow for ongoing rehab needs and recommendations.

## 2017-09-18 NOTE — PROGRESS NOTE ADULT - SUBJECTIVE AND OBJECTIVE BOX
MARITA LY    [id number]    History: 86y Female is status post L hip CRPP POD # 4. Patient is doing well and is comfortable. The patient's pain is controlled using the prescribed pain medications. The patient is participating in physical therapy. Denies nausea, vomiting, chest pain, shortness of breath, abdominal pain or fever. No new complaints.                            11.4   8.3   )-----------( 310      ( 17 Sep 2017 06:05 )             33.5     09-17    137  |  99  |  27.0<H>  ----------------------------<  88  3.8   |  26.0  |  0.85    Ca    8.8      17 Sep 2017 06:05  Mg     2.3     09-17        MEDICATIONS  (STANDING):  enoxaparin Injectable 40 milliGRAM(s) SubCutaneous every 24 hours  docusate sodium 100 milliGRAM(s) Oral three times a day    MEDICATIONS  (PRN):  acetaminophen   Tablet. 650 milliGRAM(s) Oral every 6 hours PRN Mild Pain (1 - 3)  oxyCODONE    IR 10 milliGRAM(s) Oral every 4 hours PRN Moderate Pain  morphine  - Injectable 2 milliGRAM(s) IV Push every 4 hours PRN Severe Pain (7 - 10) breakthrough  aluminum hydroxide/magnesium hydroxide/simethicone Suspension 30 milliLiter(s) Oral four times a day PRN Indigestion  ondansetron Injectable 4 milliGRAM(s) IV Push every 6 hours PRN Nausea and/or Vomiting  magnesium hydroxide Suspension 30 milliLiter(s) Oral daily PRN Constipation  bisacodyl Suppository 10 milliGRAM(s) Rectal daily PRN If no bowel movement      Physical exam: The dressing is clean, dry and intact. No drainage or discharge. No erythema is noted. No blistering. No ecchymosis. The calf is supple nontender. Passive range of motion is acceptable to due postoperative pain. Sensation to light touch is grossly intact distally. Extensor hallucis longus and flexor hallucis longus are intact. No foot drop. 2+ dorsalis pedis pulse. Capillary refill is less than 2 seconds. No cyanosis.    Primary Orthopedic Assessment:  •  86y Female is status post L hip CRPP POD # 4    Secondary  Orthopedic Assessment(s):   •     Secondary  Medical Assessment(s):   •     Plan:   • DVT prophylaxis as prescribed, including use of compression devices and ankle pumps  • Continue physical therapy  • Weightbearing as tolerated of the left lower extremity with assistance of a walker  • Incentive spirometry encouraged  • Pain control as clinically indicated  • Discharge planning as per primary team

## 2017-09-18 NOTE — DISCHARGE NOTE ADULT - PATIENT PORTAL LINK FT
“You can access the [employer] Patient Portal, offered by [facility], by registering with the following website: [email or web address]”

## 2017-09-18 NOTE — CONSULT NOTE ADULT - ATTENDING COMMENTS
86 year old female with history of dementia, living at assisted living, and was independent, admitted to hospital after a fall and found to have a left hip femoral neck - subcapital displaced fracture. Underwent ORIF. Tolerated procedure. WBAT an on lovenox for DVT prophylaxis.    Recommend acute inpatient rehab - PT/OT to improve overall function with plan to discharge back to assisted living from rehab.   May be transferred to rehab when bed available and medically stable. Thank you.   d/w CCC
Ortho Attending:    Agree with above PA note. Plan for OR AM 9/13/17 for CRPP hip. Surgery less than one hour, minimal blood loss. Please call if questions    Maxi Fagan MD  Orthopaedic Trauma Surgery

## 2017-09-19 LAB
ALBUMIN SERPL ELPH-MCNC: 3.3 G/DL — SIGNIFICANT CHANGE UP (ref 3.3–5.2)
ALP SERPL-CCNC: 72 U/L — SIGNIFICANT CHANGE UP (ref 40–120)
ALT FLD-CCNC: 10 U/L — SIGNIFICANT CHANGE UP
ANION GAP SERPL CALC-SCNC: 10 MMOL/L — SIGNIFICANT CHANGE UP (ref 5–17)
AST SERPL-CCNC: 22 U/L — SIGNIFICANT CHANGE UP
BASOPHILS # BLD AUTO: 0 K/UL — SIGNIFICANT CHANGE UP (ref 0–0.2)
BASOPHILS NFR BLD AUTO: 0.4 % — SIGNIFICANT CHANGE UP (ref 0–2)
BILIRUB SERPL-MCNC: 0.3 MG/DL — LOW (ref 0.4–2)
BUN SERPL-MCNC: 24 MG/DL — HIGH (ref 8–20)
CALCIUM SERPL-MCNC: 8.9 MG/DL — SIGNIFICANT CHANGE UP (ref 8.6–10.2)
CHLORIDE SERPL-SCNC: 98 MMOL/L — SIGNIFICANT CHANGE UP (ref 98–107)
CO2 SERPL-SCNC: 27 MMOL/L — SIGNIFICANT CHANGE UP (ref 22–29)
CREAT SERPL-MCNC: 0.82 MG/DL — SIGNIFICANT CHANGE UP (ref 0.5–1.3)
EOSINOPHIL # BLD AUTO: 0.2 K/UL — SIGNIFICANT CHANGE UP (ref 0–0.5)
EOSINOPHIL NFR BLD AUTO: 3.2 % — SIGNIFICANT CHANGE UP (ref 0–6)
GLUCOSE SERPL-MCNC: 92 MG/DL — SIGNIFICANT CHANGE UP (ref 70–115)
HCT VFR BLD CALC: 33.4 % — LOW (ref 37–47)
HGB BLD-MCNC: 11 G/DL — LOW (ref 12–16)
LYMPHOCYTES # BLD AUTO: 1.9 K/UL — SIGNIFICANT CHANGE UP (ref 1–4.8)
LYMPHOCYTES # BLD AUTO: 24.6 % — SIGNIFICANT CHANGE UP (ref 20–55)
MCHC RBC-ENTMCNC: 30.6 PG — SIGNIFICANT CHANGE UP (ref 27–31)
MCHC RBC-ENTMCNC: 32.9 G/DL — SIGNIFICANT CHANGE UP (ref 32–36)
MCV RBC AUTO: 93 FL — SIGNIFICANT CHANGE UP (ref 81–99)
MONOCYTES # BLD AUTO: 0.9 K/UL — HIGH (ref 0–0.8)
MONOCYTES NFR BLD AUTO: 11.7 % — HIGH (ref 3–10)
NEUTROPHILS # BLD AUTO: 4.6 K/UL — SIGNIFICANT CHANGE UP (ref 1.8–8)
NEUTROPHILS NFR BLD AUTO: 59.8 % — SIGNIFICANT CHANGE UP (ref 37–73)
PLATELET # BLD AUTO: 364 K/UL — SIGNIFICANT CHANGE UP (ref 150–400)
POTASSIUM SERPL-MCNC: 4.1 MMOL/L — SIGNIFICANT CHANGE UP (ref 3.5–5.3)
POTASSIUM SERPL-SCNC: 4.1 MMOL/L — SIGNIFICANT CHANGE UP (ref 3.5–5.3)
PREALB SERPL-MCNC: 12 MG/DL — LOW (ref 18–38)
PROT SERPL-MCNC: 7.1 G/DL — SIGNIFICANT CHANGE UP (ref 6.6–8.7)
RBC # BLD: 3.59 M/UL — LOW (ref 4.4–5.2)
RBC # FLD: 14.9 % — SIGNIFICANT CHANGE UP (ref 11–15.6)
SODIUM SERPL-SCNC: 135 MMOL/L — SIGNIFICANT CHANGE UP (ref 135–145)
WBC # BLD: 7.8 K/UL — SIGNIFICANT CHANGE UP (ref 4.8–10.8)
WBC # FLD AUTO: 7.8 K/UL — SIGNIFICANT CHANGE UP (ref 4.8–10.8)

## 2017-09-19 PROCEDURE — 99222 1ST HOSP IP/OBS MODERATE 55: CPT | Mod: AI

## 2017-09-19 RX ORDER — SENNA PLUS 8.6 MG/1
2 TABLET ORAL AT BEDTIME
Qty: 0 | Refills: 0 | Status: DISCONTINUED | OUTPATIENT
Start: 2017-09-19 | End: 2017-09-25

## 2017-09-19 RX ORDER — ASCORBIC ACID 60 MG
500 TABLET,CHEWABLE ORAL DAILY
Qty: 0 | Refills: 0 | Status: DISCONTINUED | OUTPATIENT
Start: 2017-09-19 | End: 2017-10-04

## 2017-09-19 RX ORDER — HYDRALAZINE HCL 50 MG
25 TABLET ORAL ONCE
Qty: 0 | Refills: 0 | Status: COMPLETED | OUTPATIENT
Start: 2017-09-19 | End: 2017-09-19

## 2017-09-19 RX ADMIN — PANTOPRAZOLE SODIUM 40 MILLIGRAM(S): 20 TABLET, DELAYED RELEASE ORAL at 06:04

## 2017-09-19 RX ADMIN — SENNA PLUS 2 TABLET(S): 8.6 TABLET ORAL at 21:26

## 2017-09-19 RX ADMIN — Medication 650 MILLIGRAM(S): at 05:19

## 2017-09-19 RX ADMIN — Medication 650 MILLIGRAM(S): at 04:19

## 2017-09-19 RX ADMIN — Medication 500 MILLIGRAM(S): at 12:42

## 2017-09-19 RX ADMIN — Medication 650 MILLIGRAM(S): at 23:47

## 2017-09-19 RX ADMIN — Medication 650 MILLIGRAM(S): at 12:42

## 2017-09-19 RX ADMIN — Medication 25 MILLIGRAM(S): at 06:22

## 2017-09-19 RX ADMIN — OXYCODONE HYDROCHLORIDE 5 MILLIGRAM(S): 5 TABLET ORAL at 06:22

## 2017-09-19 RX ADMIN — Medication 1 TABLET(S): at 17:41

## 2017-09-19 RX ADMIN — Medication 1 TABLET(S): at 06:04

## 2017-09-19 RX ADMIN — ENOXAPARIN SODIUM 40 MILLIGRAM(S): 100 INJECTION SUBCUTANEOUS at 06:03

## 2017-09-19 RX ADMIN — Medication 650 MILLIGRAM(S): at 13:42

## 2017-09-19 RX ADMIN — Medication 100 MILLIGRAM(S): at 06:03

## 2017-09-19 RX ADMIN — Medication 100 MILLIGRAM(S): at 21:26

## 2017-09-19 RX ADMIN — OXYCODONE HYDROCHLORIDE 5 MILLIGRAM(S): 5 TABLET ORAL at 07:05

## 2017-09-19 RX ADMIN — Medication 100 MILLIGRAM(S): at 13:34

## 2017-09-19 RX ADMIN — Medication 1 TABLET(S): at 12:42

## 2017-09-19 NOTE — DIETITIAN INITIAL EVALUATION ADULT. - OTHER INFO
Pt admitted for fall + fractures, s/p ORIF. Pt tolerating regular diet + Ensure BID-50% PO intake. Unknown wt history. RD to follow up.

## 2017-09-20 PROCEDURE — 99232 SBSQ HOSP IP/OBS MODERATE 35: CPT

## 2017-09-20 RX ORDER — OXYCODONE HYDROCHLORIDE 5 MG/1
5 TABLET ORAL AT BEDTIME
Qty: 0 | Refills: 0 | Status: DISCONTINUED | OUTPATIENT
Start: 2017-09-20 | End: 2017-09-26

## 2017-09-20 RX ADMIN — Medication 1 TABLET(S): at 12:20

## 2017-09-20 RX ADMIN — Medication 650 MILLIGRAM(S): at 10:28

## 2017-09-20 RX ADMIN — Medication 100 MILLIGRAM(S): at 13:48

## 2017-09-20 RX ADMIN — Medication 500 MILLIGRAM(S): at 12:20

## 2017-09-20 RX ADMIN — Medication 1 TABLET(S): at 17:40

## 2017-09-20 RX ADMIN — ENOXAPARIN SODIUM 40 MILLIGRAM(S): 100 INJECTION SUBCUTANEOUS at 05:18

## 2017-09-20 RX ADMIN — Medication 1 TABLET(S): at 05:18

## 2017-09-20 RX ADMIN — PANTOPRAZOLE SODIUM 40 MILLIGRAM(S): 20 TABLET, DELAYED RELEASE ORAL at 05:18

## 2017-09-20 RX ADMIN — Medication 100 MILLIGRAM(S): at 05:18

## 2017-09-20 RX ADMIN — Medication 650 MILLIGRAM(S): at 00:47

## 2017-09-20 RX ADMIN — Medication 100 MILLIGRAM(S): at 21:07

## 2017-09-20 RX ADMIN — Medication 650 MILLIGRAM(S): at 09:28

## 2017-09-20 NOTE — PROGRESS NOTE ADULT - SUBJECTIVE AND OBJECTIVE BOX
HISTORY OF PRESENT ILLNESS:  Pt is an 86 year old  female assisted living home resident with a PMH of Diverticula of the colon, Macular degeneration, Dementia, and arthritis arrived in the ED on 9.12.17 complained of left hip pain s/p fall after getting out of bed.   Pt was found on the ground after a fall.  She is also complained of mild abdominal pain in her lower quadrants in which she claims to be constipated.   X-ray showed a femoral neck fracture on her left side. Orthopedics consulted and underwent a left hip perc pinning on 9/14/17 by Dr. Fagan. Pt also had a UTI and was treated with Rocephin.    TODAY'S SUBJECTIVE & REVIEW OF SYMPTOMS:  Pt c/o mild pain left hip; taking Tylenol as needed during the day and wants Oxycodone only at night if needed.  +BM  Denies urinary complaints, SOB, dyspnea or cough    [ x  ] Constitutional WNL  [   ] Cardio WNL  [ x  ] Resp WNL  [ x  ] GI WNL  [   ] Heme WNL  [ x  ] Endo WNL  [   ] Skin WNL  [   ] MSK WNL  [ x  ] Neuro WNL  [   ] Cognitive WNL  [  x ] Psych WNL        PHYSICAL EXAM  Vital Signs Last 24 Hrs  T(C): 36.6 (20 Sep 2017 04:58), Max: 36.6 (20 Sep 2017 04:58)  T(F): 97.9 (20 Sep 2017 04:58), Max: 97.9 (20 Sep 2017 04:58)  HR: 67 (20 Sep 2017 04:58) (67 - 99)  BP: 162/84 (20 Sep 2017 04:58) (120/70 - 162/84)  BP(mean): --  RR: 18 (20 Sep 2017 04:58) (16 - 18)  SpO2: 95% (20 Sep 2017 04:58) (94% - 96%)    General:  PHYSICAL EXAM  Constitutional - NAD, Comfortable  Chest - CTA bilaterally  Cardiovascular - RRR, S1S2  Abdomen - BS+, Soft, NTND  Extremities - No edema.  No calf tenderness  Left hip incision with dsd cdi  Neurologic Exam -                    Cognitive - Awake, Alert, AAO to self, place, not year      Communication - Fluent, No dysarthria, repeats certain phrases      Cranial Nerves - CN 2-12 grossly intact     Motor - No focal deficits                    LEFT    UE - ShAB 5/5, EF 5/5, EE 5/5, WE 5/5,  3/5                    RIGHT UE - ShAB 5/5, EF 5/5, EE 5/5, WE 5/5,  4/5                    LEFT    LE - HF 3/5, KE 3+/5, DF 5/5, PF 5/5                    RIGHT LE - HF 4/5, KE 5/5, DF 5/5, PF 5/5        Sensory - Intact to LT    FUNCTIONAL PROGRESS:  Gait: Pt ambulated 5' RW mod assist  ADLs: grooming mod assist, bathing and UE dress max assist, LE dress total assist  Transfers: Mod assist  Bed mobility: Total assist  Communication: Intact    RECENT LABS:                          11.0   7.8   )-----------( 364      ( 19 Sep 2017 07:59 )             33.4     09-19    135  |  98  |  24.0<H>  ----------------------------<  92  4.1   |  27.0  |  0.82    Ca    8.9      19 Sep 2017 07:59    TPro  7.1  /  Alb  3.3  /  TBili  0.3<L>  /  DBili  x   /  AST  22  /  ALT  10  /  AlkPhos  72  09-19          RADIOLOGY/OTHER RESULTS:    Pt is an 85y/o female s/p left percutaneous pinning left femoral neck fracture Dr Fagan 9/14/17    Comprehensive rehab:  >3hrs PT and OT at least 5 days weekly WBAT LLE    Pain-Continue Tylenol and ice prn.  Oxycodone qhs prn.  Cont ice controlled.       Mild Dementia - stable.  Supportive care.  Bed alarm, bed rails    Chronic diastolic CHF - clinically stable off diuretics.  Managed well without meds.  Continue to monitor for signs and symptoms.  Cardiology f/u prn.    DVT Prophylaxis - Lovenox SC 40mg daily while in hospital; d/c when discharged due to high risk for falls    GI ppx: Protonix    Protein calorie malnutrition:  Prealbumin 12.  Cont Ensure, MVI    Bowel regimen:  Senna, Colace, MOM prn

## 2017-09-21 PROCEDURE — 99232 SBSQ HOSP IP/OBS MODERATE 35: CPT

## 2017-09-21 RX ADMIN — PANTOPRAZOLE SODIUM 40 MILLIGRAM(S): 20 TABLET, DELAYED RELEASE ORAL at 06:20

## 2017-09-21 RX ADMIN — Medication 1 TABLET(S): at 17:50

## 2017-09-21 RX ADMIN — Medication 100 MILLIGRAM(S): at 21:09

## 2017-09-21 RX ADMIN — Medication 1 TABLET(S): at 06:20

## 2017-09-21 RX ADMIN — Medication 650 MILLIGRAM(S): at 08:50

## 2017-09-21 RX ADMIN — Medication 650 MILLIGRAM(S): at 23:33

## 2017-09-21 RX ADMIN — Medication 650 MILLIGRAM(S): at 07:49

## 2017-09-21 RX ADMIN — SENNA PLUS 2 TABLET(S): 8.6 TABLET ORAL at 21:09

## 2017-09-21 RX ADMIN — ENOXAPARIN SODIUM 40 MILLIGRAM(S): 100 INJECTION SUBCUTANEOUS at 06:21

## 2017-09-21 RX ADMIN — Medication 100 MILLIGRAM(S): at 06:20

## 2017-09-21 RX ADMIN — Medication 1 TABLET(S): at 12:51

## 2017-09-21 RX ADMIN — Medication 500 MILLIGRAM(S): at 12:51

## 2017-09-21 RX ADMIN — SENNA PLUS 2 TABLET(S): 8.6 TABLET ORAL at 06:02

## 2017-09-21 RX ADMIN — Medication 100 MILLIGRAM(S): at 13:01

## 2017-09-21 NOTE — PROGRESS NOTE ADULT - SUBJECTIVE AND OBJECTIVE BOX
HISTORY OF PRESENT ILLNESS:  Pt is an 86 year old  female assisted living home resident with a PMH of Diverticula of the colon, Macular degeneration, Dementia, and arthritis arrived in the ED on 9.12.17 complained of left hip pain s/p fall after getting out of bed.   Pt was found on the ground after a fall.  She is also complained of mild abdominal pain in her lower quadrants in which she claims to be constipated.   X-ray showed a femoral neck fracture on her left side. Orthopedics consulted and underwent a left hip perc pinning on 9/14/17 by Dr. Fagan. Pt also had a UTI and was treated with Rocephin.    TODAY'S SUBJECTIVE & REVIEW OF SYMPTOMS:  Pt c/o mild pain left thigh; taking Tylenol as needed during the day and wants Oxycodone only at night if needed.  +BM  Denies urinary complaints, SOB, dyspnea or cough    [ x  ] Constitutional WNL  [   ] Cardio WNL  [ x  ] Resp WNL  [ x  ] GI WNL  [   ] Heme WNL  [ x  ] Endo WNL  [   ] Skin WNL  [   ] MSK WNL  [ x  ] Neuro WNL  [   ] Cognitive WNL  [  x ] Psych WNL        PHYSICAL EXAM  Vital Signs Last 24 Hrs  T(C): 35.8 (21 Sep 2017 05:21), Max: 35.9 (20 Sep 2017 20:39)  T(F): 96.4 (21 Sep 2017 05:21), Max: 96.7 (20 Sep 2017 20:39)  HR: 90 (21 Sep 2017 06:19) (77 - 91)  BP: 132/81 (21 Sep 2017 06:19) (132/81 - 180/93)  BP(mean): --  RR: 17 (21 Sep 2017 06:00) (17 - 18)  SpO2: 95% (21 Sep 2017 06:00) (94% - 96%)    General:  PHYSICAL EXAM  Constitutional - NAD, Comfortable  Chest - CTA bilaterally  Cardiovascular - RRR, S1S2  Abdomen - BS+, Soft, NTND  Extremities - No edema.  No calf tenderness  Left hip incision with dsd cdi  Neurologic Exam -                    Cognitive - Awake, Alert, AAO to self, place, not year      Communication - Fluent, No dysarthria, repeats certain phrases      Cranial Nerves - CN 2-12 grossly intact     Motor - No focal deficits                    LEFT    UE - ShAB 5/5, EF 5/5, EE 5/5, WE 5/5,  3/5                    RIGHT UE - ShAB 5/5, EF 5/5, EE 5/5, WE 5/5,  4/5                    LEFT    LE - HF 3/5, KE 3+/5, DF 5/5, PF 5/5                    RIGHT LE - HF 4/5, KE 5/5, DF 5/5, PF 5/5        Sensory - Intact to LT    FUNCTIONAL PROGRESS:  Gait: Pt ambulated 5' RW mod assist  ADLs: grooming mod assist, bathing and UE dress mod assist, LE dress max assist  Transfers: Mod assist  Bed mobility: Min assist  Communication: Intact    RECENT LABS:                          11.0   7.8   )-----------( 364      ( 19 Sep 2017 07:59 )             33.4     09-19    135  |  98  |  24.0<H>  ----------------------------<  92  4.1   |  27.0  |  0.82    Ca    8.9      19 Sep 2017 07:59    TPro  7.1  /  Alb  3.3  /  TBili  0.3<L>  /  DBili  x   /  AST  22  /  ALT  10  /  AlkPhos  72  09-19          RADIOLOGY/OTHER RESULTS:    Pt is an 87y/o female s/p left percutaneous pinning left femoral neck fracture Dr Fagan 9/14/17    Comprehensive rehab:  >3hrs PT and OT at least 5 days weekly WBAT LLE    Pain-Continue Tylenol and ice prn.  Oxycodone qhs prn.  Cont ice controlled.       Mild Dementia - stable.  Supportive care.  Bed alarm, bed rails    Chronic diastolic CHF - clinically stable off diuretics.  Managed well without meds.  Continue to monitor for signs and symptoms.  Cardiology f/u prn.    DVT Prophylaxis - Lovenox SC 40mg daily while in hospital; d/c when discharged due to high risk for falls    GI ppx: Protonix    Protein calorie malnutrition:  Prealbumin 12.  Cont Ensure, MVI    Bowel regimen:  Senna, Colace, MOM prn

## 2017-09-22 PROCEDURE — 99233 SBSQ HOSP IP/OBS HIGH 50: CPT

## 2017-09-22 PROCEDURE — 99232 SBSQ HOSP IP/OBS MODERATE 35: CPT

## 2017-09-22 RX ORDER — AMLODIPINE BESYLATE 2.5 MG/1
5 TABLET ORAL DAILY
Qty: 0 | Refills: 0 | Status: DISCONTINUED | OUTPATIENT
Start: 2017-09-22 | End: 2017-09-23

## 2017-09-22 RX ORDER — LANOLIN ALCOHOL/MO/W.PET/CERES
3 CREAM (GRAM) TOPICAL AT BEDTIME
Qty: 0 | Refills: 0 | Status: DISCONTINUED | OUTPATIENT
Start: 2017-09-22 | End: 2017-10-04

## 2017-09-22 RX ADMIN — Medication 1 TABLET(S): at 17:19

## 2017-09-22 RX ADMIN — Medication 1 TABLET(S): at 11:35

## 2017-09-22 RX ADMIN — Medication 1 TABLET(S): at 05:44

## 2017-09-22 RX ADMIN — ENOXAPARIN SODIUM 40 MILLIGRAM(S): 100 INJECTION SUBCUTANEOUS at 05:43

## 2017-09-22 RX ADMIN — Medication 100 MILLIGRAM(S): at 21:32

## 2017-09-22 RX ADMIN — Medication 650 MILLIGRAM(S): at 00:30

## 2017-09-22 RX ADMIN — Medication 100 MILLIGRAM(S): at 05:44

## 2017-09-22 RX ADMIN — Medication 3 MILLIGRAM(S): at 21:32

## 2017-09-22 RX ADMIN — Medication 500 MILLIGRAM(S): at 11:35

## 2017-09-22 RX ADMIN — SENNA PLUS 2 TABLET(S): 8.6 TABLET ORAL at 21:32

## 2017-09-22 RX ADMIN — PANTOPRAZOLE SODIUM 40 MILLIGRAM(S): 20 TABLET, DELAYED RELEASE ORAL at 05:44

## 2017-09-22 NOTE — PROGRESS NOTE ADULT - SUBJECTIVE AND OBJECTIVE BOX
MARITA LY    [id number]    86y      Female    CC: f/u hip fracture, now in acute rehab    HPI: 86 year old  female with a PMH of Diverticula of the colon, Macular degeneration, Dementia, and arthritisadmitted s/p fall and  left hip pain.  she was found on the ground after a fall.   X-ray showed a L femoral neck fracture on her left side.   She underwent ORIF on 9/14. Was discharged to rehab on 09/18.    Doing well.    INTERVAL HPI/OVERNIGHT EVENTS: no acute events    REVIEW OF SYSTEMS:    CONSTITUTIONAL: No fever  RESPIRATORY: No shortness of breath  CARDIOVASCULAR: No chest pain, palpitations  GASTROINTESTINAL: No abdominal or epigastric pain        Vital Signs Last 24 Hrs  T(C): 36.7 (22 Sep 2017 11:35), Max: 36.7 (22 Sep 2017 11:35)  T(F): 98 (22 Sep 2017 11:35), Max: 98 (22 Sep 2017 11:35)  HR: 80 (22 Sep 2017 11:35) (73 - 92)  BP: 140/75 (22 Sep 2017 11:35) (140/75 - 157/83)  BP(mean): --  RR: 16 (22 Sep 2017 11:35) (16 - 18)  SpO2: 96% (22 Sep 2017 11:35) (95% - 96%)    PHYSICAL EXAM:    GENERAL: NAD, frail elderly  HEENT: PERRL, +EOMI  NECK: soft, Supple, No JVD,   CHEST/LUNG: Clear to percussion bilaterally; No wheezing  HEART: S1S2+, Regular rate and rhythm; No murmurs  ABDOMEN: Soft, Nontender, Nondistended; Bowel sounds present  EXTREMITIES: , No clubbing, cyanosis, or edema  NEURO: AAOX3, forgetful  PSYCH: anxious      09-21 @ 07:01  -  09-22 @ 07:00  --------------------------------------------------------  IN: 0 mL / OUT: 1 mL / NET: -1 mL        LABS:      MEDICATIONS  (STANDING):  docusate sodium 100 milliGRAM(s) Oral three times a day  pantoprazole    Tablet 40 milliGRAM(s) Oral before breakfast  calcium carbonate  625 mG + Vitamin D (OsCal 250 + D) 1 Tablet(s) Oral two times a day  enoxaparin Injectable 40 milliGRAM(s) SubCutaneous every 24 hours  multivitamin 1 Tablet(s) Oral daily  ascorbic acid 500 milliGRAM(s) Oral daily  senna 2 Tablet(s) Oral at bedtime    MEDICATIONS  (PRN):  acetaminophen   Tablet. 650 milliGRAM(s) Oral every 6 hours PRN Mild Pain (1 - 3)  magnesium hydroxide Suspension 30 milliLiter(s) Oral daily PRN Constipation  oxyCODONE    IR 5 milliGRAM(s) Oral at bedtime PRN Severe Pain (7 - 10)      RADIOLOGY & ADDITIONAL TESTS:  ECHO - reviewed

## 2017-09-22 NOTE — PROGRESS NOTE ADULT - SUBJECTIVE AND OBJECTIVE BOX
HISTORY OF PRESENT ILLNESS:  Pt is an 86 year old  female assisted living home resident with a PMH of Diverticula of the colon, Macular degeneration, Dementia, and arthritis arrived in the ED on 9.12.17 complained of left hip pain s/p fall after getting out of bed.   Pt was found on the ground after a fall.  She is also complained of mild abdominal pain in her lower quadrants in which she claims to be constipated.   X-ray showed a femoral neck fracture on her left side. Orthopedics consulted and underwent a left hip perc pinning on 9/14/17 by Dr. Fagan. Pt also had a UTI and was treated with Rocephin.    TODAY'S SUBJECTIVE & REVIEW OF SYMPTOMS:  Pt c/o mild pain left thigh which she describes as "sore"; taking Tylenol as needed during the day and  Oxycodone only at night if needed.  +BM today  Denies urinary complaints, SOB, dyspnea or cough    [ x  ] Constitutional WNL  [   ] Cardio WNL  [ x  ] Resp WNL  [ x  ] GI WNL  [   ] Heme WNL  [ x  ] Endo WNL  [   ] Skin WNL  [   ] MSK WNL  [ x  ] Neuro WNL  [   ] Cognitive WNL  [  x ] Psych WNL        PHYSICAL EXAM  Vital Signs Last 24 Hrs  T(C): 36.4 (22 Sep 2017 05:08), Max: 36.4 (21 Sep 2017 16:45)  T(F): 97.6 (22 Sep 2017 05:08), Max: 97.6 (22 Sep 2017 05:08)  HR: 81 (22 Sep 2017 05:08) (73 - 92)  BP: 157/83 (22 Sep 2017 05:08) (144/76 - 157/83)  BP(mean): --  RR: 18 (22 Sep 2017 05:08) (17 - 18)  SpO2: 96% (22 Sep 2017 05:08) (95% - 96%)    General:  PHYSICAL EXAM  Constitutional - NAD, Comfortable  Chest - CTA bilaterally  Cardiovascular - RRR, S1S2  Abdomen - BS+, Soft, NTND  Extremities - No edema.  No calf tenderness  Left hip incision with dsd removed; cdi.  No erythema noted  Neurologic Exam -                    Cognitive - Awake, Alert, AAO to self, place, not year      Communication - Fluent, No dysarthria     Cranial Nerves - CN 2-12 grossly intact     Motor - No focal deficits                    LEFT    UE - ShAB 5/5, EF 5/5, EE 5/5, WE 5/5,  3/5                    RIGHT UE - ShAB 5/5, EF 5/5, EE 5/5, WE 5/5,  4/5                    LEFT    LE - HF 3+/5, KE 3+/5, DF 5/5, PF 5/5                    RIGHT LE - HF 4/5, KE 5/5, DF 5/5, PF 5/5        Sensory - Intact to LT    FUNCTIONAL PROGRESS:  Gait: Pt ambulated 15' RW min assist  ADLs: grooming mod assist, bathing and UE dress mod assist, LE dress max assist  Transfers: Min assist  Bed mobility: Min-max assist  Communication: Intact    RECENT LABS:                          11.0   7.8   )-----------( 364      ( 19 Sep 2017 07:59 )             33.4     09-19    135  |  98  |  24.0<H>  ----------------------------<  92  4.1   |  27.0  |  0.82    Ca    8.9      19 Sep 2017 07:59    TPro  7.1  /  Alb  3.3  /  TBili  0.3<L>  /  DBili  x   /  AST  22  /  ALT  10  /  AlkPhos  72  09-19          RADIOLOGY/OTHER RESULTS:    Pt is an 87y/o female s/p left percutaneous pinning left femoral neck fracture Dr Fagan 9/14/17    Comprehensive rehab:  >3hrs PT and OT at least 5 days weekly WBAT LLE    Pain-Continue Tylenol, ice prn and Oxycodone qhs prn.       Mild Dementia - stable.  Supportive care.  Bed alarm, bed rails    Chronic diastolic CHF - Was clinically stable off diuretics.  Elevated BPs noted.  D/w hospitalist-Add Norvasc 5mg daily and monitor  Cardiology f/u prn.    DVT Prophylaxis - Lovenox SC 40mg daily while in hospital; d/c when discharged due to high risk for falls    GI ppx: Protonix    Protein calorie malnutrition:  Prealbumin 12.  Cont Ensure, MVI    Bowel regimen:  Senna, Colace, MOM prn

## 2017-09-23 PROCEDURE — 99232 SBSQ HOSP IP/OBS MODERATE 35: CPT | Mod: GC

## 2017-09-23 PROCEDURE — 99232 SBSQ HOSP IP/OBS MODERATE 35: CPT

## 2017-09-23 RX ORDER — AMLODIPINE BESYLATE 2.5 MG/1
5 TABLET ORAL DAILY
Qty: 0 | Refills: 0 | Status: DISCONTINUED | OUTPATIENT
Start: 2017-09-23 | End: 2017-09-28

## 2017-09-23 RX ADMIN — Medication 650 MILLIGRAM(S): at 12:20

## 2017-09-23 RX ADMIN — Medication 100 MILLIGRAM(S): at 21:14

## 2017-09-23 RX ADMIN — Medication 650 MILLIGRAM(S): at 06:05

## 2017-09-23 RX ADMIN — Medication 650 MILLIGRAM(S): at 21:16

## 2017-09-23 RX ADMIN — Medication 1 TABLET(S): at 12:20

## 2017-09-23 RX ADMIN — PANTOPRAZOLE SODIUM 40 MILLIGRAM(S): 20 TABLET, DELAYED RELEASE ORAL at 05:04

## 2017-09-23 RX ADMIN — Medication 650 MILLIGRAM(S): at 05:05

## 2017-09-23 RX ADMIN — Medication 1 TABLET(S): at 18:00

## 2017-09-23 RX ADMIN — Medication 1 TABLET(S): at 05:04

## 2017-09-23 RX ADMIN — SENNA PLUS 2 TABLET(S): 8.6 TABLET ORAL at 21:14

## 2017-09-23 RX ADMIN — AMLODIPINE BESYLATE 5 MILLIGRAM(S): 2.5 TABLET ORAL at 05:06

## 2017-09-23 RX ADMIN — ENOXAPARIN SODIUM 40 MILLIGRAM(S): 100 INJECTION SUBCUTANEOUS at 05:07

## 2017-09-23 RX ADMIN — Medication 650 MILLIGRAM(S): at 20:45

## 2017-09-23 RX ADMIN — Medication 3 MILLIGRAM(S): at 21:14

## 2017-09-23 RX ADMIN — Medication 500 MILLIGRAM(S): at 12:19

## 2017-09-23 RX ADMIN — Medication 650 MILLIGRAM(S): at 13:20

## 2017-09-23 RX ADMIN — Medication 100 MILLIGRAM(S): at 05:04

## 2017-09-23 NOTE — PROGRESS NOTE ADULT - ASSESSMENT
86 year old  female with a PMH of Diverticula of the colon, Macular degeneration, Dementia, and arthritis arrived in the ED complaining left hip pain. She lives at a assisted living home where she was found on the ground after a fall.   X-ray showed a L femoral neck fracture on her left side.   She underwent ORIF on 9/14.      > L hip fracture s/p ORIF - needs pain control. May schedule Tyelnol 650mg TID for now.  Ct PT in acute rehab.   >Elevated BP - without h/o HTN - pain may be contributing. add norvasc if remains elevated  after pain control  > Mild Dementia - stable.  Supportive care.  > Chronic diastolic CHF - clinically euvolemic, not on any meds. was seen by Cardiology while in house.  > severe protein calorie malnutrition - nutrition eval

## 2017-09-23 NOTE — PROGRESS NOTE ADULT - SUBJECTIVE AND OBJECTIVE BOX
HISTORY OF PRESENT ILLNESS:  Pt is an 86 year old  female assisted living home resident with a PMH of Diverticula of the colon, Macular degeneration, Dementia, and arthritis arrived in the ED on 9.12.17 complained of left hip pain s/p fall after getting out of bed.   Pt was found on the ground after a fall.  She is also complained of mild abdominal pain in her lower quadrants in which she claims to be constipated.   X-ray showed a femoral neck fracture on her left side. Orthopedics consulted and underwent a left hip perc pinning on 9/14/17 by Dr. Fagan. Pt also had a UTI and was treated with Rocephin.    TODAY'S SUBJECTIVE & REVIEW OF SYMPTOMS:  Patient pain controlled. Slept well. Pain controlled.     [ x  ] Constitutional WNL  [   ] Cardio WNL  [ x  ] Resp WNL  [ x  ] GI WNL  [   ] Heme WNL  [ x  ] Endo WNL  [   ] Skin WNL  [   ] MSK WNL  [ x  ] Neuro WNL  [   ] Cognitive WNL  [  x ] Psych WNL        PHYSICAL EXAM  Vital Signs Last 24 Hrs  T(C): 36.2 (23 Sep 2017 05:05), Max: 36.9 (22 Sep 2017 19:49)  T(F): 97.2 (23 Sep 2017 05:05), Max: 98.5 (22 Sep 2017 19:49)  HR: 83 (23 Sep 2017 05:05) (80 - 83)  BP: 140/85 (23 Sep 2017 05:05) (136/72 - 140/85)  BP(mean): --  RR: 17 (23 Sep 2017 05:05) (16 - 17)  SpO2: 96% (23 Sep 2017 05:05) (96% - 97%)    General:  PHYSICAL EXAM  Constitutional - NAD, Comfortable  Chest - CTA bilaterally  Cardiovascular - RRR, S1S2  Abdomen - BS+, Soft, NTND  Extremities - No edema.  No calf tenderness  Left hip incision with dsd removed; cdi.  No erythema noted  Neurologic Exam -                    Cognitive - Awake, Alert, AAO to self, place, not year      Communication - Fluent, No dysarthria     Cranial Nerves - CN 2-12 grossly intact     Motor - No focal deficits                    LEFT    UE - ShAB 5/5, EF 5/5, EE 5/5, WE 5/5,  3/5                    RIGHT UE - ShAB 5/5, EF 5/5, EE 5/5, WE 5/5,  4/5                    LEFT    LE - HF 3+/5, KE 3+/5, DF 5/5, PF 5/5                    RIGHT LE - HF 4/5, KE 5/5, DF 5/5, PF 5/5        Sensory - Intact to LT    RECENT LABS:                          11.0   7.8   )-----------( 364      ( 19 Sep 2017 07:59 )             33.4     09-19    135  |  98  |  24.0<H>  ----------------------------<  92  4.1   |  27.0  |  0.82    Ca    8.9      19 Sep 2017 07:59    TPro  7.1  /  Alb  3.3  /  TBili  0.3<L>  /  DBili  x   /  AST  22  /  ALT  10  /  AlkPhos  72  09-19    MEDICATIONS  (STANDING):  docusate sodium 100 milliGRAM(s) Oral three times a day  pantoprazole    Tablet 40 milliGRAM(s) Oral before breakfast  calcium carbonate  625 mG + Vitamin D (OsCal 250 + D) 1 Tablet(s) Oral two times a day  enoxaparin Injectable 40 milliGRAM(s) SubCutaneous every 24 hours  multivitamin 1 Tablet(s) Oral daily  ascorbic acid 500 milliGRAM(s) Oral daily  senna 2 Tablet(s) Oral at bedtime  amLODIPine   Tablet 5 milliGRAM(s) Oral daily  melatonin. 3 milliGRAM(s) Oral at bedtime    MEDICATIONS  (PRN):  acetaminophen   Tablet. 650 milliGRAM(s) Oral every 6 hours PRN Mild Pain (1 - 3)  magnesium hydroxide Suspension 30 milliLiter(s) Oral daily PRN Constipation  oxyCODONE    IR 5 milliGRAM(s) Oral at bedtime PRN Severe Pain (7 - 10)        RADIOLOGY/OTHER RESULTS:    Pt is an 85y/o female s/p left percutaneous pinning left femoral neck fracture Dr Fagan 9/14/17    Comprehensive rehab:  >3hrs PT and OT at least 5 days weekly WBAT LLE    Pain-Continue Tylenol, ice prn and Oxycodone qhs prn.       Mild Dementia - stable.  Supportive care.  Bed alarm, bed rails    Chronic diastolic CHF - Was clinically stable off diuretics.  Elevated BPs noted.  D/w hospitalist-Add Norvasc 5mg daily and monitor  Cardiology f/u prn.    DVT Prophylaxis - Lovenox SC 40mg daily while in hospital; d/c when discharged due to high risk for falls    GI ppx: Protonix    Protein calorie malnutrition:  Prealbumin 12.  Cont Ensure, MVI    Bowel regimen:  Senna, Colace, MOM prn

## 2017-09-23 NOTE — PROGRESS NOTE ADULT - SUBJECTIVE AND OBJECTIVE BOX
MARITA LY    [id number]    86y      Female    CC: f/u hip fracture, now in acute rehab    HPI: 86 year old  female with a PMH of Diverticula of the colon, Macular degeneration, Dementia, and arthritisadmitted s/p fall and  left hip pain.  she was found on the ground after a fall.   X-ray showed a L femoral neck fracture on her left side.   She underwent ORIF on 9/14. Was discharged to rehab on 09/18.    Doing well. pain controlled with tylenol.    INTERVAL HPI/OVERNIGHT EVENTS: no acute events    REVIEW OF SYSTEMS:    CONSTITUTIONAL: No fever    Vital Signs Last 24 Hrs  T(C): 36.1 (23 Sep 2017 12:29), Max: 36.9 (22 Sep 2017 19:49)  T(F): 97 (23 Sep 2017 12:29), Max: 98.5 (22 Sep 2017 19:49)  HR: 80 (23 Sep 2017 12:29) (80 - 83)  BP: 160/80 (23 Sep 2017 12:29) (136/72 - 160/80)  BP(mean): --  RR: 18 (23 Sep 2017 12:29) (17 - 18)  SpO2: 97% (23 Sep 2017 12:29) (96% - 97%)  PHYSICAL EXAM:    GENERAL: NAD, frail elderly  HEENT: PERRL, +EOMI  EXTREMITIES: , No clubbing, cyanosis, or edema  NEURO: AAOX3, forgetful  PSYCH: anxious        LABS:            MEDICATIONS  (STANDING):  docusate sodium 100 milliGRAM(s) Oral three times a day  pantoprazole    Tablet 40 milliGRAM(s) Oral before breakfast  calcium carbonate  625 mG + Vitamin D (OsCal 250 + D) 1 Tablet(s) Oral two times a day  enoxaparin Injectable 40 milliGRAM(s) SubCutaneous every 24 hours  multivitamin 1 Tablet(s) Oral daily  ascorbic acid 500 milliGRAM(s) Oral daily  senna 2 Tablet(s) Oral at bedtime  melatonin. 3 milliGRAM(s) Oral at bedtime  amLODIPine   Tablet 5 milliGRAM(s) Oral daily    MEDICATIONS  (PRN):  acetaminophen   Tablet. 650 milliGRAM(s) Oral every 6 hours PRN Mild Pain (1 - 3)  magnesium hydroxide Suspension 30 milliLiter(s) Oral daily PRN Constipation  oxyCODONE    IR 5 milliGRAM(s) Oral at bedtime PRN Severe Pain (7 - 10)      RADIOLOGY & ADDITIONAL TESTS:

## 2017-09-24 PROCEDURE — 99232 SBSQ HOSP IP/OBS MODERATE 35: CPT | Mod: GC

## 2017-09-24 RX ADMIN — Medication 650 MILLIGRAM(S): at 22:15

## 2017-09-24 RX ADMIN — Medication 650 MILLIGRAM(S): at 05:16

## 2017-09-24 RX ADMIN — Medication 500 MILLIGRAM(S): at 11:32

## 2017-09-24 RX ADMIN — ENOXAPARIN SODIUM 40 MILLIGRAM(S): 100 INJECTION SUBCUTANEOUS at 05:13

## 2017-09-24 RX ADMIN — Medication 1 TABLET(S): at 11:32

## 2017-09-24 RX ADMIN — Medication 1 TABLET(S): at 17:38

## 2017-09-24 RX ADMIN — Medication 650 MILLIGRAM(S): at 11:32

## 2017-09-24 RX ADMIN — PANTOPRAZOLE SODIUM 40 MILLIGRAM(S): 20 TABLET, DELAYED RELEASE ORAL at 05:13

## 2017-09-24 RX ADMIN — Medication 1 TABLET(S): at 05:13

## 2017-09-24 RX ADMIN — Medication 650 MILLIGRAM(S): at 21:20

## 2017-09-24 RX ADMIN — AMLODIPINE BESYLATE 5 MILLIGRAM(S): 2.5 TABLET ORAL at 05:13

## 2017-09-24 RX ADMIN — Medication 650 MILLIGRAM(S): at 05:55

## 2017-09-24 RX ADMIN — Medication 3 MILLIGRAM(S): at 21:20

## 2017-09-24 RX ADMIN — Medication 100 MILLIGRAM(S): at 05:13

## 2017-09-24 NOTE — PROGRESS NOTE ADULT - SUBJECTIVE AND OBJECTIVE BOX
HISTORY OF PRESENT ILLNESS:  Pt is an 86 year old  female assisted living home resident with a PMH of Diverticula of the colon, Macular degeneration, Dementia, and arthritis arrived in the ED on 9.12.17 complained of left hip pain s/p fall after getting out of bed.   Pt was found on the ground after a fall.  She is also complained of mild abdominal pain in her lower quadrants in which she claims to be constipated.   X-ray showed a femoral neck fracture on her left side. Orthopedics consulted and underwent a left hip perc pinning on 9/14/17 by Dr. Fagan. Pt also had a UTI and was treated with Rocephin.    TODAY'S SUBJECTIVE & REVIEW OF SYMPTOMS:  Appreciate Hospitalist f/u. Patient with no complaints this morning. Pain controlled. Slept well. Pain controlled.     [ x  ] Constitutional WNL  [X   ] Cardio WNL  [ x  ] Resp WNL  [ x  ] GI WNL  [   ] Heme WNL  [ x  ] Endo WNL  [   ] Skin WNL  [   ] MSK WNL  [ x  ] Neuro WNL  [   ] Cognitive WNL  [  x ] Psych WNL        PHYSICAL EXAM  Vital Signs Last 24 Hrs  T(C): 36.1 (24 Sep 2017 04:56), Max: 36.2 (23 Sep 2017 20:51)  T(F): 97 (24 Sep 2017 04:56), Max: 97.2 (23 Sep 2017 20:51)  HR: 70 (24 Sep 2017 04:56) (70 - 80)  BP: 125/68 (24 Sep 2017 04:56) (125/68 - 160/80)  BP(mean): --  RR: 18 (24 Sep 2017 04:56) (18 - 18)  SpO2: 96% (24 Sep 2017 04:56) (96% - 97%)    General:  PHYSICAL EXAM  Constitutional - NAD, Comfortable  Chest - CTA bilaterally  Cardiovascular - RRR, S1S2  Abdomen - BS+, Soft, NTND  Extremities - No edema.  No calf tenderness  Left hip incision cdi.  No erythema noted  Neurologic Exam -                    Cognitive - Awake, Alert, AAO to self, place, not year      Communication - Fluent, No dysarthria     Cranial Nerves - CN 2-12 grossly intact     Motor - No focal deficits                    LEFT    UE - ShAB 5/5, EF 5/5, EE 5/5, WE 5/5,  3/5                    RIGHT UE - ShAB 5/5, EF 5/5, EE 5/5, WE 5/5,  4/5                    LEFT    LE - HF 3+/5, KE 3+/5, DF 5/5, PF 5/5                    RIGHT LE - HF 4/5, KE 5/5, DF 5/5, PF 5/5        Sensory - Intact to LT    RECENT LABS:                          11.0   7.8   )-----------( 364      ( 19 Sep 2017 07:59 )             33.4     09-19    135  |  98  |  24.0<H>  ----------------------------<  92  4.1   |  27.0  |  0.82    Ca    8.9      19 Sep 2017 07:59    TPro  7.1  /  Alb  3.3  /  TBili  0.3<L>  /  DBili  x   /  AST  22  /  ALT  10  /  AlkPhos  72  09-19    MEDICATIONS  (STANDING):  docusate sodium 100 milliGRAM(s) Oral three times a day  pantoprazole    Tablet 40 milliGRAM(s) Oral before breakfast  calcium carbonate  625 mG + Vitamin D (OsCal 250 + D) 1 Tablet(s) Oral two times a day  enoxaparin Injectable 40 milliGRAM(s) SubCutaneous every 24 hours  multivitamin 1 Tablet(s) Oral daily  ascorbic acid 500 milliGRAM(s) Oral daily  senna 2 Tablet(s) Oral at bedtime  melatonin. 3 milliGRAM(s) Oral at bedtime  amLODIPine   Tablet 5 milliGRAM(s) Oral daily    MEDICATIONS  (PRN):  acetaminophen   Tablet. 650 milliGRAM(s) Oral every 6 hours PRN Mild Pain (1 - 3)  magnesium hydroxide Suspension 30 milliLiter(s) Oral daily PRN Constipation  oxyCODONE    IR 5 milliGRAM(s) Oral at bedtime PRN Severe Pain (7 - 10)        RADIOLOGY/OTHER RESULTS:    Pt is an 85y/o female s/p left percutaneous pinning left femoral neck fracture Dr Fagan 9/14/17    Comprehensive rehab:  >3hrs PT and OT at least 5 days weekly WBAT LLE    Pain-Continue Tylenol, ice prn and Oxycodone qhs prn.       Mild Dementia - stable.  Supportive care.  Bed alarm, bed rails    Chronic diastolic CHF - Was clinically stable off diuretics.  BP with better control.  Hospitalist following.  Cardiology f/u prn.    DVT Prophylaxis - Lovenox SC 40mg daily while in hospital; d/c when discharged due to high risk for falls    GI ppx: Protonix    Protein calorie malnutrition:  Prealbumin 12.  Cont Ensure, MVI    Bowel regimen:  Senna, Colace, MOM prn

## 2017-09-25 LAB
ANION GAP SERPL CALC-SCNC: 11 MMOL/L — SIGNIFICANT CHANGE UP (ref 5–17)
BUN SERPL-MCNC: 22 MG/DL — HIGH (ref 8–20)
CALCIUM SERPL-MCNC: 9.1 MG/DL — SIGNIFICANT CHANGE UP (ref 8.6–10.2)
CHLORIDE SERPL-SCNC: 99 MMOL/L — SIGNIFICANT CHANGE UP (ref 98–107)
CO2 SERPL-SCNC: 27 MMOL/L — SIGNIFICANT CHANGE UP (ref 22–29)
CREAT SERPL-MCNC: 0.99 MG/DL — SIGNIFICANT CHANGE UP (ref 0.5–1.3)
GLUCOSE SERPL-MCNC: 93 MG/DL — SIGNIFICANT CHANGE UP (ref 70–115)
HCT VFR BLD CALC: 31.3 % — LOW (ref 37–47)
HGB BLD-MCNC: 10.5 G/DL — LOW (ref 12–16)
MCHC RBC-ENTMCNC: 30.6 PG — SIGNIFICANT CHANGE UP (ref 27–31)
MCHC RBC-ENTMCNC: 33.5 G/DL — SIGNIFICANT CHANGE UP (ref 32–36)
MCV RBC AUTO: 91.3 FL — SIGNIFICANT CHANGE UP (ref 81–99)
PLATELET # BLD AUTO: 494 K/UL — HIGH (ref 150–400)
POTASSIUM SERPL-MCNC: 4.5 MMOL/L — SIGNIFICANT CHANGE UP (ref 3.5–5.3)
POTASSIUM SERPL-SCNC: 4.5 MMOL/L — SIGNIFICANT CHANGE UP (ref 3.5–5.3)
PREALB SERPL-MCNC: 16 MG/DL — LOW (ref 18–38)
RBC # BLD: 3.43 M/UL — LOW (ref 4.4–5.2)
RBC # FLD: 14.9 % — SIGNIFICANT CHANGE UP (ref 11–15.6)
SODIUM SERPL-SCNC: 137 MMOL/L — SIGNIFICANT CHANGE UP (ref 135–145)
WBC # BLD: 8.9 K/UL — SIGNIFICANT CHANGE UP (ref 4.8–10.8)
WBC # FLD AUTO: 8.9 K/UL — SIGNIFICANT CHANGE UP (ref 4.8–10.8)

## 2017-09-25 PROCEDURE — 99232 SBSQ HOSP IP/OBS MODERATE 35: CPT

## 2017-09-25 RX ORDER — DOCUSATE SODIUM 100 MG
100 CAPSULE ORAL
Qty: 0 | Refills: 0 | Status: DISCONTINUED | OUTPATIENT
Start: 2017-09-25 | End: 2017-10-04

## 2017-09-25 RX ORDER — SENNA PLUS 8.6 MG/1
2 TABLET ORAL AT BEDTIME
Qty: 0 | Refills: 0 | Status: DISCONTINUED | OUTPATIENT
Start: 2017-09-25 | End: 2017-09-30

## 2017-09-25 RX ORDER — NYSTATIN CREAM 100000 [USP'U]/G
1 CREAM TOPICAL
Qty: 0 | Refills: 0 | Status: DISCONTINUED | OUTPATIENT
Start: 2017-09-25 | End: 2017-10-04

## 2017-09-25 RX ADMIN — Medication 100 MILLIGRAM(S): at 06:09

## 2017-09-25 RX ADMIN — ENOXAPARIN SODIUM 40 MILLIGRAM(S): 100 INJECTION SUBCUTANEOUS at 06:08

## 2017-09-25 RX ADMIN — AMLODIPINE BESYLATE 5 MILLIGRAM(S): 2.5 TABLET ORAL at 06:08

## 2017-09-25 RX ADMIN — Medication 650 MILLIGRAM(S): at 03:40

## 2017-09-25 RX ADMIN — Medication 1 TABLET(S): at 11:44

## 2017-09-25 RX ADMIN — Medication 650 MILLIGRAM(S): at 10:20

## 2017-09-25 RX ADMIN — Medication 3 MILLIGRAM(S): at 21:51

## 2017-09-25 RX ADMIN — Medication 650 MILLIGRAM(S): at 11:20

## 2017-09-25 RX ADMIN — Medication 1 TABLET(S): at 17:35

## 2017-09-25 RX ADMIN — Medication 650 MILLIGRAM(S): at 20:58

## 2017-09-25 RX ADMIN — NYSTATIN CREAM 1 APPLICATION(S): 100000 CREAM TOPICAL at 17:35

## 2017-09-25 RX ADMIN — Medication 650 MILLIGRAM(S): at 22:00

## 2017-09-25 RX ADMIN — Medication 500 MILLIGRAM(S): at 11:44

## 2017-09-25 RX ADMIN — PANTOPRAZOLE SODIUM 40 MILLIGRAM(S): 20 TABLET, DELAYED RELEASE ORAL at 06:09

## 2017-09-25 RX ADMIN — Medication 1 TABLET(S): at 06:09

## 2017-09-25 RX ADMIN — Medication 650 MILLIGRAM(S): at 04:32

## 2017-09-25 NOTE — PROGRESS NOTE ADULT - SUBJECTIVE AND OBJECTIVE BOX
CC: f/u hip fracture, now in acute rehab    HPI: 86 year old  female with a PMH of Diverticula of the colon, Macular degeneration, Dementia, and arthritisadmitted s/p fall and  left hip pain.  she was found on the ground after a fall.   X-ray showed a L femoral neck fracture on her left side.   She underwent ORIF on 9/14. Was discharged to rehab on 09/18.    Doing well. pain controlled with tylenol.    PAST MEDICAL & SURGICAL HISTORY:  Late onset Alzheimer's disease without behavioral disturbance  Diverticula of colon  Macular degeneration  Arthritis  No significant past surgical history      MEDICATIONS  (STANDING):  pantoprazole    Tablet 40 milliGRAM(s) Oral before breakfast  calcium carbonate  625 mG + Vitamin D (OsCal 250 + D) 1 Tablet(s) Oral two times a day  enoxaparin Injectable 40 milliGRAM(s) SubCutaneous every 24 hours  multivitamin 1 Tablet(s) Oral daily  ascorbic acid 500 milliGRAM(s) Oral daily  melatonin. 3 milliGRAM(s) Oral at bedtime  amLODIPine   Tablet 5 milliGRAM(s) Oral daily  nystatin Powder 1 Application(s) Topical two times a day    MEDICATIONS  (PRN):  acetaminophen   Tablet. 650 milliGRAM(s) Oral every 6 hours PRN Mild Pain (1 - 3)  magnesium hydroxide Suspension 30 milliLiter(s) Oral daily PRN Constipation  oxyCODONE    IR 5 milliGRAM(s) Oral at bedtime PRN Severe Pain (7 - 10)  senna 2 Tablet(s) Oral at bedtime PRN Constipation  docusate sodium 100 milliGRAM(s) Oral two times a day PRN Constipation      LABS:                          10.5   8.9   )-----------( 494      ( 25 Sep 2017 05:51 )             31.3     09-25    137  |  99  |  22.0<H>  ----------------------------<  93  4.5   |  27.0  |  0.99    Ca    9.1      25 Sep 2017 05:51            RADIOLOGY & ADDITIONAL TESTS:      REVIEW OF SYSTEMS:    CONSTITUTIONAL: No fever, weight loss, or fatigue  EYES: No eye pain, visual disturbances, or discharge  ENMT:  No difficulty hearing, tinnitus, vertigo; No sinus or throat pain  NECK: No pain or stiffness  RESPIRATORY: No cough, wheezing, chills or hemoptysis; No shortness of breath  CARDIOVASCULAR: No chest pain, palpitations, dizziness, or leg swelling  GASTROINTESTINAL: No abdominal or epigastric pain. No nausea, vomiting, or hematemesis; No diarrhea or constipation. No melena or hematochezia.  GENITOURINARY: No dysuria, frequency, hematuria, or incontinence  NEUROLOGICAL: No headaches, memory loss, loss of strength, numbness, or tremors  SKIN: No itching, burning, rashes, or lesions   LYMPH NODES: No enlarged glands  ENDOCRINE: No heat or cold intolerance; No hair loss  MUSCULOSKELETAL:   PSYCHIATRIC: No depression, anxiety, mood swings, or difficulty sleeping  HEME/LYMPH: No easy bruising, or bleeding gums  ALLERGY AND IMMUNOLOGIC: No hives or eczema    Vital Signs Last 24 Hrs  T(C): 36.1 (25 Sep 2017 10:25), Max: 36.1 (25 Sep 2017 10:25)  T(F): 97 (25 Sep 2017 10:25), Max: 97 (25 Sep 2017 10:25)  HR: 61 (25 Sep 2017 10:25) (61 - 80)  BP: 136/70 (25 Sep 2017 10:25) (130/70 - 145/71)  BP(mean): --  RR: 16 (25 Sep 2017 10:25) (16 - 18)  SpO2: 95% (25 Sep 2017 10:25) (95% - 97%)  PHYSICAL EXAM:    GENERAL: NAD, well-groomed, well-developed  HEAD:  Atraumatic, Normocephalic  EYES: EOMI, PERRLA, conjunctiva and sclera clear  NECK: Supple, No JVD, Normal thyroid  NERVOUS SYSTEM:  Alert & Oriented X3, no focal deficit  CHEST/LUNG: CTA b/l ,  no  rales, rhonchi, wheezing, or rubs  HEART: Regular rate and rhythm; No murmurs, rubs, or gallops  ABDOMEN: Soft, Nontender, Nondistended; Bowel sounds present  EXTREMITIES:  2+ Peripheral Pulses, No clubbing, cyanosis, or edema  LYMPH: No lymphadenopathy noted  SKIN: No rashes or lesions CC: f/u hip fracture, now in acute rehab, pain well controlled     HPI: 86 year old  female with a PMH of Diverticula of the colon, Macular degeneration, Dementia, and arthritis admitted s/p fall and  left hip pain.  she was found on the ground after a fall.   X-ray showed a L femoral neck fracture on her left side.   She underwent ORIF on 9/14. Was discharged to rehab on 09/18.    Doing well. pain controlled with tylenol.    PAST MEDICAL & SURGICAL HISTORY:  Late onset Alzheimer's disease without behavioral disturbance  Diverticula of colon  Macular degeneration  Arthritis  No significant past surgical history      MEDICATIONS  (STANDING):  pantoprazole    Tablet 40 milliGRAM(s) Oral before breakfast  calcium carbonate  625 mG + Vitamin D (OsCal 250 + D) 1 Tablet(s) Oral two times a day  enoxaparin Injectable 40 milliGRAM(s) SubCutaneous every 24 hours  multivitamin 1 Tablet(s) Oral daily  ascorbic acid 500 milliGRAM(s) Oral daily  melatonin. 3 milliGRAM(s) Oral at bedtime  amLODIPine   Tablet 5 milliGRAM(s) Oral daily  nystatin Powder 1 Application(s) Topical two times a day    MEDICATIONS  (PRN):  acetaminophen   Tablet. 650 milliGRAM(s) Oral every 6 hours PRN Mild Pain (1 - 3)  magnesium hydroxide Suspension 30 milliLiter(s) Oral daily PRN Constipation  oxyCODONE    IR 5 milliGRAM(s) Oral at bedtime PRN Severe Pain (7 - 10)  senna 2 Tablet(s) Oral at bedtime PRN Constipation  docusate sodium 100 milliGRAM(s) Oral two times a day PRN Constipation      LABS:                          10.5   8.9   )-----------( 494      ( 25 Sep 2017 05:51 )             31.3     09-25    137  |  99  |  22.0<H>  ----------------------------<  93  4.5   |  27.0  |  0.99    Ca    9.1      25 Sep 2017 05:51            RADIOLOGY & ADDITIONAL TESTS:      REVIEW OF SYSTEMS:  L hip pain well controlled , all other systems reviewed and are negative     Vital Signs Last 24 Hrs  T(C): 36.1 (25 Sep 2017 10:25), Max: 36.1 (25 Sep 2017 10:25)  T(F): 97 (25 Sep 2017 10:25), Max: 97 (25 Sep 2017 10:25)  HR: 61 (25 Sep 2017 10:25) (61 - 80)  BP: 136/70 (25 Sep 2017 10:25) (130/70 - 145/71)  BP(mean): --  RR: 16 (25 Sep 2017 10:25) (16 - 18)  SpO2: 95% (25 Sep 2017 10:25) (95% - 97%)    PHYSICAL EXAM:    GENERAL: NAD, well-groomed, well-developed  HEAD:  Atraumatic, Normocephalic  EYES: EOMI, PERRLA, conjunctiva and sclera clear  NECK: Supple, No JVD, Normal thyroid  NERVOUS SYSTEM:  Alert & Oriented X3, no focal deficit  CHEST/LUNG: CTA b/l ,  no  rales, rhonchi, wheezing, or rubs  HEART: Regular rate and rhythm; No murmurs, rubs, or gallops  ABDOMEN: Soft, Nontender, Nondistended; Bowel sounds present  EXTREMITIES:  2+ Peripheral Pulses, No clubbing, cyanosis, or edema , L hip incision CDI   LYMPH: No lymphadenopathy noted  SKIN: No rashes or lesions

## 2017-09-25 NOTE — PROGRESS NOTE ADULT - SUBJECTIVE AND OBJECTIVE BOX
HISTORY OF PRESENT ILLNESS:  Pt is an 86 year old  female assisted living home resident with a PMH of Diverticula of the colon, Macular degeneration, Dementia, and arthritis arrived in the ED on 9.12.17 complained of left hip pain s/p fall after getting out of bed.   Pt was found on the ground after a fall.  She is also complained of mild abdominal pain in her lower quadrants in which she claims to be constipated.   X-ray showed a femoral neck fracture on her left side. Orthopedics consulted and underwent a left hip perc pinning on 9/14/17 by Dr. Fagan. Pt also had a UTI and was treated with Rocephin.    TODAY'S SUBJECTIVE & REVIEW OF SYMPTOMS:  Appreciate Hospitalist f/u.  Patient with complaints of headache and "soreness" left thigh.  Slept well.  Denies B&B complaints    [ x  ] Constitutional WNL  [X   ] Cardio WNL  [ x  ] Resp WNL  [ x  ] GI WNL  [   ] Heme WNL  [ x  ] Endo WNL  [   ] Skin WNL  [   ] MSK WNL  [ x  ] Neuro WNL  [   ] Cognitive WNL  [  x ] Psych WNL        PHYSICAL EXAM  Vital Signs Last 24 Hrs  T(C): 35.9 (25 Sep 2017 05:59), Max: 36.3 (24 Sep 2017 11:40)  T(F): 96.7 (25 Sep 2017 05:59), Max: 97.3 (24 Sep 2017 11:40)  HR: 68 (25 Sep 2017 05:59) (68 - 88)  BP: 145/71 (25 Sep 2017 05:59) (130/70 - 145/71)  BP(mean): --  RR: 17 (25 Sep 2017 05:59) (17 - 18)  SpO2: 95% (25 Sep 2017 05:59) (95% - 97%)    General:  PHYSICAL EXAM  Constitutional - NAD, Comfortable  Chest - CTA bilaterally  Cardiovascular - RRR, S1S2  Abdomen - BS+, Soft, NTND  Extremities - No edema.  No calf tenderness  Left hip incision cdi.  No erythema noted  Neurologic Exam -                    Cognitive - Awake, Alert, AAO to self, place, not year      Communication - Fluent, No dysarthria     Cranial Nerves - CN 2-12 grossly intact     Motor - No focal deficits                    LEFT    UE - ShAB 5/5, EF 5/5, EE 5/5, WE 5/5,  3/5                    RIGHT UE - ShAB 5/5, EF 5/5, EE 5/5, WE 5/5,  4/5                    LEFT    LE - HF 3+/5, KE 3+/5, DF 5/5, PF 5/5                    RIGHT LE - HF 4/5, KE 5/5, DF 5/5, PF 5/5        Sensory - Intact to LT    Functional status:  Gait: Pt ambulated 40' RW min assist  ADLs: grooming mod assist, bathing and UE dress mod assist, LE dress max assist  Transfers: Min assist  Bed mobility: Min-max assist  Communication: Intact    RECENT LABS:                               10.5   8.9   )-----------( 494      ( 25 Sep 2017 05:51 )             31.3   09-25    137  |  99  |  22.0<H>  ----------------------------<  93  4.5   |  27.0  |  0.99    Ca    9.1      25 Sep 2017 05:51        MEDICATIONS  (STANDING):  docusate sodium 100 milliGRAM(s) Oral three times a day  pantoprazole    Tablet 40 milliGRAM(s) Oral before breakfast  calcium carbonate  625 mG + Vitamin D (OsCal 250 + D) 1 Tablet(s) Oral two times a day  enoxaparin Injectable 40 milliGRAM(s) SubCutaneous every 24 hours  multivitamin 1 Tablet(s) Oral daily  ascorbic acid 500 milliGRAM(s) Oral daily  senna 2 Tablet(s) Oral at bedtime  melatonin. 3 milliGRAM(s) Oral at bedtime  amLODIPine   Tablet 5 milliGRAM(s) Oral daily    MEDICATIONS  (PRN):  acetaminophen   Tablet. 650 milliGRAM(s) Oral every 6 hours PRN Mild Pain (1 - 3)  magnesium hydroxide Suspension 30 milliLiter(s) Oral daily PRN Constipation  oxyCODONE    IR 5 milliGRAM(s) Oral at bedtime PRN Severe Pain (7 - 10)        RADIOLOGY/OTHER RESULTS:    Pt is an 87y/o female s/p left percutaneous pinning left femoral neck fracture Dr Fagan 9/14/17    Comprehensive rehab:  >3hrs PT and OT at least 5 days weekly WBAT LLE    Pain-Continue Tylenol, ice prn and Oxycodone qhs prn.       Mild Dementia - stable.  Supportive care.  Bed alarm, bed rails    Chronic diastolic CHF - Was clinically stable off diuretics.  BP with better control since Norvasc 5mg daily added as per discussion with hospitalist.  Cardiology f/u prn.    DVT Prophylaxis - Lovenox SC 40mg daily while in hospital; d/c when discharged due to high risk for falls    GI ppx: Protonix    Protein calorie malnutrition:  Prealbumin improved to 16 9/25 from previous 12.  Cont Ensure, MVI    Bowel regimen:  Senna, Colace, MOM prn

## 2017-09-25 NOTE — PROGRESS NOTE ADULT - ASSESSMENT
86 year old  female with a PMH of Diverticula of the colon, Macular degeneration, Dementia, and arthritis arrived in the ED complaining left hip pain. She lives at a assisted living home where she was found on the ground after a fall.   X-ray showed a L femoral neck fracture on her left side.   She underwent ORIF on 9/14.      > L hip fracture s/p ORIF - needs pain control. May schedule Tyelnol 650mg TID for now.  Ct PT in acute rehab.   >Elevated BP - without h/o HTN - pain may be contributing. Now well controlled with Norvasc   > Mild Dementia - stable.  Supportive care.  > Chronic diastolic CHF - clinically euvolemic, not on any meds. was seen by Cardiology while in house.  > severe protein calorie malnutrition - nutrition eval 86 year old  female with a PMH of Diverticula of the colon, Macular degeneration, Dementia, and arthritis arrived in the ED complaining left hip pain. She lives at a assisted living home where she was found on the ground after a fall.   X-ray showed a L femoral neck fracture on her left side.   She underwent ORIF on 9/14.      > L hip fracture s/p ORIF -  pain control.   Ct PT in acute rehab.   >Elevated BP - without h/o HTN - pain may be contributing. Now well controlled with Norvasc   > Mild Dementia - stable.  Supportive care.  > Chronic diastolic CHF - clinically euvolemic, off diuretics ,  was seen by Cardiology while in house.  > severe protein calorie malnutrition - nutrition eval appreciated

## 2017-09-26 PROCEDURE — 99232 SBSQ HOSP IP/OBS MODERATE 35: CPT

## 2017-09-26 RX ORDER — ACETAMINOPHEN 500 MG
650 TABLET ORAL AT BEDTIME
Qty: 0 | Refills: 0 | Status: DISCONTINUED | OUTPATIENT
Start: 2017-09-26 | End: 2017-10-04

## 2017-09-26 RX ADMIN — Medication 650 MILLIGRAM(S): at 22:10

## 2017-09-26 RX ADMIN — Medication 650 MILLIGRAM(S): at 21:10

## 2017-09-26 RX ADMIN — ENOXAPARIN SODIUM 40 MILLIGRAM(S): 100 INJECTION SUBCUTANEOUS at 06:33

## 2017-09-26 RX ADMIN — Medication 1 TABLET(S): at 17:44

## 2017-09-26 RX ADMIN — PANTOPRAZOLE SODIUM 40 MILLIGRAM(S): 20 TABLET, DELAYED RELEASE ORAL at 06:32

## 2017-09-26 RX ADMIN — Medication 650 MILLIGRAM(S): at 08:07

## 2017-09-26 RX ADMIN — Medication 650 MILLIGRAM(S): at 09:07

## 2017-09-26 RX ADMIN — Medication 3 MILLIGRAM(S): at 21:10

## 2017-09-26 RX ADMIN — NYSTATIN CREAM 1 APPLICATION(S): 100000 CREAM TOPICAL at 06:32

## 2017-09-26 RX ADMIN — AMLODIPINE BESYLATE 5 MILLIGRAM(S): 2.5 TABLET ORAL at 06:33

## 2017-09-26 RX ADMIN — NYSTATIN CREAM 1 APPLICATION(S): 100000 CREAM TOPICAL at 17:44

## 2017-09-26 RX ADMIN — Medication 500 MILLIGRAM(S): at 12:10

## 2017-09-26 RX ADMIN — Medication 1 TABLET(S): at 12:10

## 2017-09-26 RX ADMIN — Medication 1 TABLET(S): at 06:32

## 2017-09-26 NOTE — PROGRESS NOTE ADULT - SUBJECTIVE AND OBJECTIVE BOX
HISTORY OF PRESENT ILLNESS:  Pt is an 86 year old  female assisted living home resident with a PMH of Diverticula of the colon, Macular degeneration, Dementia, and arthritis arrived in the ED on 9.12.17 complained of left hip pain s/p fall after getting out of bed.   Pt was found on the ground after a fall.  She is also complained of mild abdominal pain in her lower quadrants in which she claims to be constipated.   X-ray showed a femoral neck fracture on her left side. Orthopedics consulted and underwent a left hip perc pinning on 9/14/17 by Dr. Fagan. Pt also had a UTI and was treated with Rocephin.    TODAY'S SUBJECTIVE & REVIEW OF SYMPTOMS:  Appreciate Hospitalist f/u.  Patient reports that she is much improved; LLE is stronger and with less pain.  Pt has mild difficulty sleeping partially due to discomfort and fatigue during the day and is requesting Tylenol each night.  Denies B&B complaints    [ x  ] Constitutional WNL  [X   ] Cardio WNL  [ x  ] Resp WNL  [ x  ] GI WNL  [   ] Heme WNL  [ x  ] Endo WNL  [   ] Skin WNL  [   ] MSK WNL  [ x  ] Neuro WNL  [   ] Cognitive WNL  [  x ] Psych WNL        PHYSICAL EXAM  Vital Signs Last 24 Hrs  T(C): 36.4 (26 Sep 2017 05:24), Max: 36.4 (26 Sep 2017 05:24)  T(F): 97.5 (26 Sep 2017 05:24), Max: 97.5 (26 Sep 2017 05:24)  HR: 74 (26 Sep 2017 05:24) (74 - 82)  BP: 168/79 (26 Sep 2017 05:24) (151/81 - 168/79)  BP(mean): --  RR: 18 (26 Sep 2017 05:24) (17 - 18)  SpO2: 96% (26 Sep 2017 05:24) (96% - 98%)    General:  PHYSICAL EXAM  Constitutional - NAD, Comfortable  Chest - CTA bilaterally  Cardiovascular - RRR, S1S2  Abdomen - BS+, Soft, NTND  Extremities - No edema.  No calf tenderness  Left hip incision cdi.  No erythema noted  Neurologic Exam -                    Cognitive - Awake, Alert, AAO to self, place, not year      Communication - Fluent, No dysarthria     Cranial Nerves - CN 2-12 grossly intact     Motor - No focal deficits                    LEFT    UE - ShAB 5/5, EF 5/5, EE 5/5, WE 5/5,  3/5                    RIGHT UE - ShAB 5/5, EF 5/5, EE 5/5, WE 5/5,  4/5                    LEFT    LE - HF 4/5, KE 4/5, DF 5/5, PF 5/5                    RIGHT LE - HF 5/5, KE 5/5, DF 5/5, PF 5/5        Sensory - Intact to LT    Functional status:  Gait: Pt ambulated 50' RW min assist  ADLs: grooming mod assist, bathing and UE dress mod assist, LE dress max assist  Transfers: Min assist  Bed mobility: Mod assist  Communication: Intact    RECENT LABS:                               10.5   8.9   )-----------( 494      ( 25 Sep 2017 05:51 )             31.3   09-25    137  |  99  |  22.0<H>  ----------------------------<  93  4.5   |  27.0  |  0.99    Ca    9.1      25 Sep 2017 05:51        MEDICATIONS  (STANDING):  docusate sodium 100 milliGRAM(s) Oral three times a day  pantoprazole    Tablet 40 milliGRAM(s) Oral before breakfast  calcium carbonate  625 mG + Vitamin D (OsCal 250 + D) 1 Tablet(s) Oral two times a day  enoxaparin Injectable 40 milliGRAM(s) SubCutaneous every 24 hours  multivitamin 1 Tablet(s) Oral daily  ascorbic acid 500 milliGRAM(s) Oral daily  senna 2 Tablet(s) Oral at bedtime  melatonin. 3 milliGRAM(s) Oral at bedtime  amLODIPine   Tablet 5 milliGRAM(s) Oral daily    MEDICATIONS  (PRN):  acetaminophen   Tablet. 650 milliGRAM(s) Oral every 6 hours PRN Mild Pain (1 - 3)  magnesium hydroxide Suspension 30 milliLiter(s) Oral daily PRN Constipation  oxyCODONE    IR 5 milliGRAM(s) Oral at bedtime PRN Severe Pain (7 - 10)        RADIOLOGY/OTHER RESULTS:    Pt is an 85y/o female s/p left percutaneous pinning left femoral neck fracture Dr Fagan 9/14/17    Comprehensive rehab:  >3hrs PT and OT at least 5 days weekly WBAT LLE    Pain-Improved.  Continue Tylenol, ice prn.  D/C Oxycodone       Insomnia-Melatonin and Tylenol qhs    Mild Dementia - stable.  Supportive care.  Bed alarm, bed rails    Chronic diastolic CHF - Was clinically stable off diuretics.  BP with better control since Norvasc 5mg daily added as per discussion with hospitalist.  Cardiology and hospitalist f/u prn.    DVT Prophylaxis - Lovenox SC 40mg daily while in hospital; d/c when discharged due to high risk for falls    GI ppx: Protonix    Protein calorie malnutrition:  Prealbumin improved to 16 9/25 from previous 12.  Cont Ensure, MVI    Bowel regimen:  Senna, Colace, MOM prn

## 2017-09-27 PROCEDURE — 99232 SBSQ HOSP IP/OBS MODERATE 35: CPT

## 2017-09-27 RX ADMIN — Medication 500 MILLIGRAM(S): at 11:51

## 2017-09-27 RX ADMIN — Medication 3 MILLIGRAM(S): at 21:31

## 2017-09-27 RX ADMIN — Medication 1 TABLET(S): at 11:51

## 2017-09-27 RX ADMIN — Medication 650 MILLIGRAM(S): at 22:30

## 2017-09-27 RX ADMIN — Medication 1 TABLET(S): at 05:12

## 2017-09-27 RX ADMIN — Medication 650 MILLIGRAM(S): at 21:32

## 2017-09-27 RX ADMIN — ENOXAPARIN SODIUM 40 MILLIGRAM(S): 100 INJECTION SUBCUTANEOUS at 05:12

## 2017-09-27 RX ADMIN — NYSTATIN CREAM 1 APPLICATION(S): 100000 CREAM TOPICAL at 05:12

## 2017-09-27 RX ADMIN — AMLODIPINE BESYLATE 5 MILLIGRAM(S): 2.5 TABLET ORAL at 05:12

## 2017-09-27 RX ADMIN — Medication 650 MILLIGRAM(S): at 00:00

## 2017-09-27 RX ADMIN — NYSTATIN CREAM 1 APPLICATION(S): 100000 CREAM TOPICAL at 17:04

## 2017-09-27 RX ADMIN — PANTOPRAZOLE SODIUM 40 MILLIGRAM(S): 20 TABLET, DELAYED RELEASE ORAL at 05:12

## 2017-09-27 RX ADMIN — Medication 1 TABLET(S): at 17:04

## 2017-09-27 RX ADMIN — Medication 650 MILLIGRAM(S): at 08:33

## 2017-09-27 NOTE — PROGRESS NOTE ADULT - SUBJECTIVE AND OBJECTIVE BOX
HISTORY OF PRESENT ILLNESS:  Pt is an 86 year old  female assisted living home resident with a PMH of Diverticula of the colon, Macular degeneration, Dementia, and arthritis arrived in the ED on 9.12.17 complained of left hip pain s/p fall after getting out of bed.   Pt was found on the ground after a fall.  She is also complained of mild abdominal pain in her lower quadrants in which she claims to be constipated.   X-ray showed a femoral neck fracture on her left side. Orthopedics consulted and underwent a left hip perc pinning on 9/14/17 by Dr. Fagan. Pt also had a UTI and was treated with Rocephin.    TODAY'S SUBJECTIVE & REVIEW OF SYMPTOMS:  Patient reports that she is much improved; LLE is stronger and with less pain.  Pt slept well last night with the Tylenol and Melatonin given.  Denies B&B complaints; +BM 9/25    [ x  ] Constitutional WNL  [X   ] Cardio WNL  [ x  ] Resp WNL  [ x  ] GI WNL  [   ] Heme WNL  [ x  ] Endo WNL  [   ] Skin WNL  [   ] MSK WNL  [ x  ] Neuro WNL  [   ] Cognitive WNL  [  x ] Psych WNL        PHYSICAL EXAM  Vital Signs Last 24 Hrs  T(C): 36 (27 Sep 2017 05:16), Max: 36.1 (26 Sep 2017 12:07)  T(F): 96.8 (27 Sep 2017 05:16), Max: 97 (26 Sep 2017 12:07)  HR: 76 (27 Sep 2017 05:16) (72 - 76)  BP: 150/73 (27 Sep 2017 06:27) (138/70 - 165/95)  BP(mean): --  RR: 18 (27 Sep 2017 05:16) (16 - 18)  SpO2: 95% (27 Sep 2017 05:16) (95% - 98%)    General:  PHYSICAL EXAM  Constitutional - NAD, Comfortable  Chest - CTA bilaterally  Cardiovascular - RRR, S1S2  Abdomen - BS+, Soft, NTND  Extremities - No edema.  No calf tenderness  Left hip incision cdi.  No erythema noted  Neurologic Exam -                    Cognitive - Awake, Alert, AAO to self, place, not year      Communication - Fluent, No dysarthria     Cranial Nerves - CN 2-12 grossly intact     Motor - No focal deficits                    LEFT    UE - ShAB 5/5, EF 5/5, EE 5/5, WE 5/5,  3/5                    RIGHT UE - ShAB 5/5, EF 5/5, EE 5/5, WE 5/5,  4/5                    LEFT    LE - HF 4/5, KE 4/5, DF 5/5, PF 5/5                    RIGHT LE - HF 5/5, KE 5/5, DF 5/5, PF 5/5        Sensory - Intact to LT    Functional status:  Gait: Pt ambulated 75' RW min assist  ADLs: grooming min assist, bathing and UE dress mod assist, LE dress total assist  Transfers: Min assist  Bed mobility: Min assist  Communication: Intact    RECENT LABS:                               10.5   8.9   )-----------( 494      ( 25 Sep 2017 05:51 )             31.3   09-25    137  |  99  |  22.0<H>  ----------------------------<  93  4.5   |  27.0  |  0.99    Ca    9.1      25 Sep 2017 05:51        MEDICATIONS  (STANDING):  docusate sodium 100 milliGRAM(s) Oral three times a day  pantoprazole    Tablet 40 milliGRAM(s) Oral before breakfast  calcium carbonate  625 mG + Vitamin D (OsCal 250 + D) 1 Tablet(s) Oral two times a day  enoxaparin Injectable 40 milliGRAM(s) SubCutaneous every 24 hours  multivitamin 1 Tablet(s) Oral daily  ascorbic acid 500 milliGRAM(s) Oral daily  senna 2 Tablet(s) Oral at bedtime  melatonin. 3 milliGRAM(s) Oral at bedtime  amLODIPine   Tablet 5 milliGRAM(s) Oral daily    MEDICATIONS  (PRN):  acetaminophen   Tablet. 650 milliGRAM(s) Oral every 6 hours PRN Mild Pain (1 - 3)  magnesium hydroxide Suspension 30 milliLiter(s) Oral daily PRN Constipation  oxyCODONE    IR 5 milliGRAM(s) Oral at bedtime PRN Severe Pain (7 - 10)        RADIOLOGY/OTHER RESULTS:    Pt is an 85y/o female s/p left percutaneous pinning left femoral neck fracture Dr Fagan 9/14/17    Comprehensive rehab:  >3hrs PT and OT at least 5 days weekly WBAT LLE    Pain-Improved.  Continue Tylenol, ice prn.  D/Cd Oxycodone       Insomnia-Melatonin and Tylenol qhs    Mild Dementia - stable.  Supportive care.  Bed alarm, bed rails    Chronic diastolic CHF - Was clinically stable off diuretics.  BP with better control although still elevated since Norvasc 5mg daily added as per discussion with hospitalist.  Cardiology and hospitalist f/u prn.    DVT Prophylaxis - Lovenox SC 40mg daily while in hospital; d/c when discharged due to high risk for falls    GI ppx: Protonix    Protein calorie malnutrition:  Prealbumin improved to 16 9/25 from previous 12.  Cont Ensure, MVI    Bowel regimen:  Senna, Colace, MOM prn

## 2017-09-28 PROCEDURE — 99232 SBSQ HOSP IP/OBS MODERATE 35: CPT

## 2017-09-28 RX ORDER — AMLODIPINE BESYLATE 2.5 MG/1
2.5 TABLET ORAL DAILY
Qty: 0 | Refills: 0 | Status: DISCONTINUED | OUTPATIENT
Start: 2017-09-29 | End: 2017-09-29

## 2017-09-28 RX ADMIN — ENOXAPARIN SODIUM 40 MILLIGRAM(S): 100 INJECTION SUBCUTANEOUS at 05:36

## 2017-09-28 RX ADMIN — Medication 650 MILLIGRAM(S): at 08:50

## 2017-09-28 RX ADMIN — NYSTATIN CREAM 1 APPLICATION(S): 100000 CREAM TOPICAL at 05:35

## 2017-09-28 RX ADMIN — Medication 650 MILLIGRAM(S): at 08:19

## 2017-09-28 RX ADMIN — Medication 1 TABLET(S): at 11:42

## 2017-09-28 RX ADMIN — Medication 500 MILLIGRAM(S): at 11:42

## 2017-09-28 RX ADMIN — PANTOPRAZOLE SODIUM 40 MILLIGRAM(S): 20 TABLET, DELAYED RELEASE ORAL at 05:35

## 2017-09-28 RX ADMIN — Medication 1 TABLET(S): at 05:35

## 2017-09-28 RX ADMIN — NYSTATIN CREAM 1 APPLICATION(S): 100000 CREAM TOPICAL at 18:37

## 2017-09-28 RX ADMIN — Medication 1 TABLET(S): at 18:37

## 2017-09-28 NOTE — PROGRESS NOTE ADULT - SUBJECTIVE AND OBJECTIVE BOX
HISTORY OF PRESENT ILLNESS:  Pt is an 86 year old  female assisted living home resident with a PMH of Diverticula of the colon, Macular degeneration, Dementia, and arthritis arrived in the ED on 9.12.17 complained of left hip pain s/p fall after getting out of bed.   Pt was found on the ground after a fall.  She is also complained of mild abdominal pain in her lower quadrants in which she claims to be constipated.   X-ray showed a femoral neck fracture on her left side. Orthopedics consulted and underwent a left hip perc pinning on 9/14/17 by Dr. Fagan. Pt also had a UTI and was treated with Rocephin.    TODAY'S SUBJECTIVE & REVIEW OF SYMPTOMS:  Patient reports that she is much improved;.  Pt denies pain at rest.  Slept well with Tylenol and Melatonin given.   Denies B&B complaints    [ x  ] Constitutional WNL  [X   ] Cardio WNL  [ x  ] Resp WNL  [ x  ] GI WNL  [   ] Heme WNL  [ x  ] Endo WNL  [   ] Skin WNL  [   ] MSK WNL  [ x  ] Neuro WNL  [   ] Cognitive WNL  [  x ] Psych WNL        PHYSICAL EXAM  Vital Signs Last 24 Hrs  T(C): 36.4 (28 Sep 2017 08:15), Max: 36.7 (27 Sep 2017 13:01)  T(F): 97.6 (28 Sep 2017 08:15), Max: 98.1 (27 Sep 2017 21:47)  HR: 67 (28 Sep 2017 08:15) (67 - 87)  BP: 94/623 (28 Sep 2017 08:15) (94/623 - 140/80)  BP(mean): --  RR: 16 (28 Sep 2017 08:15) (16 - 18)  SpO2: 97% (28 Sep 2017 08:15) (94% - 97%)    General:  PHYSICAL EXAM  Constitutional - NAD, Comfortable  Chest - CTAB  Cardiovascular - RRR, S1S2  Abdomen - BS+, Soft, NTND  Extremities - Calves soft and NTTP.  Left hip incision cdi.  No erythema or edema noted  Neurologic Exam -                    Cognitive - Awake, Alert, AAO to self, place, not year      Communication - Fluent, No dysarthria     Cranial Nerves - CN 2-12 grossly intact     Motor - No focal deficits.  5/5 BUE and BLE with exception of left hip and knee=4/5     Sensory - Intact to LT    Functional status:  Gait: Pt ambulated 75' RW min assist  ADLs: grooming min assist, bathing and UE dress mod assist, LE dress total assist  Transfers: Min assist  Bed mobility: Min assist  Communication: Intact    RECENT LABS:                               10.5   8.9   )-----------( 494      ( 25 Sep 2017 05:51 )             31.3   09-25    137  |  99  |  22.0<H>  ----------------------------<  93  4.5   |  27.0  |  0.99    Ca    9.1      25 Sep 2017 05:51        MEDICATIONS  (STANDING):  docusate sodium 100 milliGRAM(s) Oral three times a day  pantoprazole    Tablet 40 milliGRAM(s) Oral before breakfast  calcium carbonate  625 mG + Vitamin D (OsCal 250 + D) 1 Tablet(s) Oral two times a day  enoxaparin Injectable 40 milliGRAM(s) SubCutaneous every 24 hours  multivitamin 1 Tablet(s) Oral daily  ascorbic acid 500 milliGRAM(s) Oral daily  senna 2 Tablet(s) Oral at bedtime  melatonin. 3 milliGRAM(s) Oral at bedtime  amLODIPine   Tablet 5 milliGRAM(s) Oral daily    MEDICATIONS  (PRN):  acetaminophen   Tablet. 650 milliGRAM(s) Oral every 6 hours PRN Mild Pain (1 - 3)  magnesium hydroxide Suspension 30 milliLiter(s) Oral daily PRN Constipation  oxyCODONE    IR 5 milliGRAM(s) Oral at bedtime PRN Severe Pain (7 - 10)        RADIOLOGY/OTHER RESULTS:    Pt is an 85y/o female s/p left percutaneous pinning left femoral neck fracture Dr Fagan 9/14/17    Comprehensive rehab:  >3hrs PT and OT at least 5 days weekly [facility] L[facility]    Pain-Improved.  Continue Tylenol, ice prn.      Insomnia-Melatonin and Tylenol qhs    Mild Dementia - stable.  Supportive care.  Bed alarm, bed rails    Chronic diastolic CHF - Was clinically stable off diuretics.  BP with better control since Norvasc 5mg daily added as per discussion with hospitalist however held this a.m due to parameters.  Consider D/C Norvasc.   Cardiology and hospitalist f/u prn.    DVT Prophylaxis - Lovenox SC 40mg daily while in hospital; d/c when discharged due to high risk for falls    GI ppx: Protonix    Protein calorie malnutrition:  Prealbumin improved to 16 9/25 from previous 12.  Cont Ensure, MVI    Bowel regimen:  Senna, Colace, MOM prn

## 2017-09-29 PROCEDURE — 99232 SBSQ HOSP IP/OBS MODERATE 35: CPT

## 2017-09-29 PROCEDURE — 99233 SBSQ HOSP IP/OBS HIGH 50: CPT

## 2017-09-29 RX ADMIN — Medication 650 MILLIGRAM(S): at 23:13

## 2017-09-29 RX ADMIN — Medication 650 MILLIGRAM(S): at 10:09

## 2017-09-29 RX ADMIN — Medication 100 MILLIGRAM(S): at 18:32

## 2017-09-29 RX ADMIN — Medication 1 TABLET(S): at 05:37

## 2017-09-29 RX ADMIN — Medication 1 TABLET(S): at 18:27

## 2017-09-29 RX ADMIN — PANTOPRAZOLE SODIUM 40 MILLIGRAM(S): 20 TABLET, DELAYED RELEASE ORAL at 05:37

## 2017-09-29 RX ADMIN — Medication 500 MILLIGRAM(S): at 13:00

## 2017-09-29 RX ADMIN — Medication 3 MILLIGRAM(S): at 21:03

## 2017-09-29 RX ADMIN — Medication 1 TABLET(S): at 13:00

## 2017-09-29 RX ADMIN — NYSTATIN CREAM 1 APPLICATION(S): 100000 CREAM TOPICAL at 18:27

## 2017-09-29 RX ADMIN — AMLODIPINE BESYLATE 2.5 MILLIGRAM(S): 2.5 TABLET ORAL at 05:37

## 2017-09-29 RX ADMIN — NYSTATIN CREAM 1 APPLICATION(S): 100000 CREAM TOPICAL at 05:37

## 2017-09-29 RX ADMIN — ENOXAPARIN SODIUM 40 MILLIGRAM(S): 100 INJECTION SUBCUTANEOUS at 05:37

## 2017-09-29 RX ADMIN — Medication 650 MILLIGRAM(S): at 11:00

## 2017-09-29 RX ADMIN — Medication 650 MILLIGRAM(S): at 21:04

## 2017-09-29 NOTE — PROGRESS NOTE ADULT - SUBJECTIVE AND OBJECTIVE BOX
HISTORY OF PRESENT ILLNESS:  Pt is an 86 year old  female assisted living home resident with a PMH of Diverticula of the colon, Macular degeneration, Dementia, and arthritis arrived in the ED on 9.12.17 complained of left hip pain s/p fall after getting out of bed.   Pt was found on the ground after a fall.  She is also complained of mild abdominal pain in her lower quadrants in which she claims to be constipated.   X-ray showed a femoral neck fracture on her left side. Orthopedics consulted and underwent a left hip perc pinning on 9/14/17 by Dr. Fagan. Pt also had a UTI and was treated with Rocephin.    TODAY'S SUBJECTIVE & REVIEW OF SYMPTOMS:  Patient reports that she is much improved;.  Pt denies pain at rest.  Sleeping well with Tylenol and Melatonin given.   Denies B&B complaints    [ x  ] Constitutional WNL  [X   ] Cardio WNL  [ x  ] Resp WNL  [ x  ] GI WNL  [   ] Heme WNL  [ x  ] Endo WNL  [   ] Skin WNL  [   ] MSK WNL  [ x  ] Neuro WNL  [   ] Cognitive WNL  [  x ] Psych WNL        PHYSICAL EXAM  Vital Signs Last 24 Hrs  T(C): 36.1 (29 Sep 2017 12:00), Max: 37.1 (29 Sep 2017 05:46)  T(F): 97 (29 Sep 2017 12:00), Max: 98.7 (29 Sep 2017 05:46)  HR: 82 (29 Sep 2017 12:00) (80 - 112)  BP: 131/69 (29 Sep 2017 12:00) (101/65 - 180/86)  BP(mean): --  RR: 18 (29 Sep 2017 12:00) (18 - 18)  SpO2: 97% (29 Sep 2017 12:00) (95% - 97%)    General:  PHYSICAL EXAM  Constitutional - NAD, Comfortable  Chest - CTAB  Cardiovascular - RRR, S1S2  Abdomen - BS+, Soft, NTND  Extremities - Calves soft and NTTP.  Left hip incision cdi.  No erythema or edema noted  Neurologic Exam -                    Cognitive - Awake, Alert, AAO to self, place, not year      Communication - Fluent, No dysarthria     Cranial Nerves - CN 2-12 grossly intact     Motor - No focal deficits.  5/5 BUE and BLE with exception of left hip and knee=4/5     Sensory - Intact to LT    Functional status:  Gait: Pt ambulated 75' RW min assist  ADLs: grooming min assist, bathing and UE and LE dressing mod assist  Transfers: Min assist  Bed mobility: Min assist  Communication: Intact    RECENT LABS:                               10.5   8.9   )-----------( 494      ( 25 Sep 2017 05:51 )             31.3   09-25    137  |  99  |  22.0<H>  ----------------------------<  93  4.5   |  27.0  |  0.99    Ca    9.1      25 Sep 2017 05:51        MEDICATIONS  (STANDING):  docusate sodium 100 milliGRAM(s) Oral three times a day  pantoprazole    Tablet 40 milliGRAM(s) Oral before breakfast  calcium carbonate  625 mG + Vitamin D (OsCal 250 + D) 1 Tablet(s) Oral two times a day  enoxaparin Injectable 40 milliGRAM(s) SubCutaneous every 24 hours  multivitamin 1 Tablet(s) Oral daily  ascorbic acid 500 milliGRAM(s) Oral daily  senna 2 Tablet(s) Oral at bedtime  melatonin. 3 milliGRAM(s) Oral at bedtime  amLODIPine   Tablet 5 milliGRAM(s) Oral daily    MEDICATIONS  (PRN):  acetaminophen   Tablet. 650 milliGRAM(s) Oral every 6 hours PRN Mild Pain (1 - 3)  magnesium hydroxide Suspension 30 milliLiter(s) Oral daily PRN Constipation  oxyCODONE    IR 5 milliGRAM(s) Oral at bedtime PRN Severe Pain (7 - 10)        RADIOLOGY/OTHER RESULTS:    Pt is an 87y/o female s/p left percutaneous pinning left femoral neck fracture Dr Fagan 9/14/17    Comprehensive rehab:  >3hrs PT and OT at least 5 days weekly [facility] L[facility]    Pain-Improved.  Continue Tylenol, ice prn.      Insomnia-Melatonin and Tylenol qhs    Mild Dementia - stable.  Supportive care.  Bed alarm, bed rails    Chronic diastolic CHF - Was clinically stable off diuretics.  BP with better control since Norvasc 5mg daily added as per discussion with hospitalist however yesterday a.m due to parameters.  D/C Norvasc as per hospitalist.  Monitor vital signs   Cardiology  f/u prn.    DVT Prophylaxis - Lovenox SC 40mg daily while in hospital; d/c when discharged due to high risk for falls    GI ppx: Protonix    Protein calorie malnutrition:  Prealbumin improved to 16 9/25 from previous 12.  Cont Ensure, MVI    Bowel regimen:  Senna, Colace, MOM prn

## 2017-09-29 NOTE — PROGRESS NOTE ADULT - ASSESSMENT
86 year old  female with a PMH of Diverticula of the colon, Macular degeneration, Dementia, and arthritis arrived in the ED complaining left hip pain. She lives at a assisted living home where she was found on the ground after a fall.   X-ray showed a L femoral neck fracture on her left side.   She underwent ORIF on 9/14.      > L hip fracture s/p ORIF -  pain control.   Ct PT in acute rehab.   >labile BP - without h/o HTN - would monitor off norvasc - pt asymptomatic with low BP episodes. afebrile.   > Mild Dementia - stable.  Supportive care.  > Chronic diastolic CHF - clinically euvolemic, off diuretics   > severe protein calorie malnutrition - dietary supplement.

## 2017-09-29 NOTE — PROGRESS NOTE ADULT - SUBJECTIVE AND OBJECTIVE BOX
MARITA LY    [id number]    86y      Female      CC: reconsult for labile BP on norvas. admitted for hip fracture,    HPI: 86 year old  female with a PMH of Diverticula of the colon, Macular degeneration, Dementia, and arthritis admitted s/p fall and  left hip pain.  she was found on the ground after a fall.   X-ray showed a L femoral neck fracture on her left side.   She underwent ORIF on 9/14. Was discharged to rehab on 09/18.  Doing well. pain controlled with tylenol.  no complaints today  NO constipation      INTERVAL HPI/OVERNIGHT EVENTS: no acute events    REVIEW OF SYSTEMS:    CONSTITUTIONAL: No fever  RESPIRATORY: No cough  GASTROINTESTINAL: No abdominal or epigastric pain. No nausea, vomiting      Vital Signs Last 24 Hrs  T(C): 37.1 (29 Sep 2017 05:46), Max: 37.1 (29 Sep 2017 05:46)  T(F): 98.7 (29 Sep 2017 05:46), Max: 98.7 (29 Sep 2017 05:46)  HR: 112 (29 Sep 2017 10:12) (80 - 112)  BP: 101/65 (29 Sep 2017 10:12) (101/65 - 180/86)  BP(mean): --  RR: 18 (29 Sep 2017 10:12) (18 - 18)  SpO2: 96% (29 Sep 2017 05:46) (95% - 96%)    PHYSICAL EXAM:    GENERAL: NAD, well-groomed  HEENT: PERRL, +EOMI  NECK: soft, Supple, No JVD,   CHEST/LUNG: Clear to percussion bilaterally; No wheezing  HEART: S1S2+, Regular rate and rhythm; No murmurs, rubs, or gallops  EXTREMITIES: No clubbing, cyanosis, or edema  NEURO: AAOX3        LABS:                  MEDICATIONS  (STANDING):  pantoprazole    Tablet 40 milliGRAM(s) Oral before breakfast  calcium carbonate  625 mG + Vitamin D (OsCal 250 + D) 1 Tablet(s) Oral two times a day  enoxaparin Injectable 40 milliGRAM(s) SubCutaneous every 24 hours  multivitamin 1 Tablet(s) Oral daily  ascorbic acid 500 milliGRAM(s) Oral daily  melatonin. 3 milliGRAM(s) Oral at bedtime  nystatin Powder 1 Application(s) Topical two times a day  acetaminophen   Tablet. 650 milliGRAM(s) Oral at bedtime  amLODIPine   Tablet 2.5 milliGRAM(s) Oral daily    MEDICATIONS  (PRN):  acetaminophen   Tablet. 650 milliGRAM(s) Oral every 6 hours PRN Mild Pain (1 - 3)  magnesium hydroxide Suspension 30 milliLiter(s) Oral daily PRN Constipation  senna 2 Tablet(s) Oral at bedtime PRN Constipation  docusate sodium 100 milliGRAM(s) Oral two times a day PRN Constipation      RADIOLOGY & ADDITIONAL TESTS:

## 2017-09-30 PROCEDURE — 99232 SBSQ HOSP IP/OBS MODERATE 35: CPT

## 2017-09-30 RX ORDER — AMLODIPINE BESYLATE 2.5 MG/1
2.5 TABLET ORAL ONCE
Qty: 0 | Refills: 0 | Status: COMPLETED | OUTPATIENT
Start: 2017-09-30 | End: 2017-09-30

## 2017-09-30 RX ADMIN — NYSTATIN CREAM 1 APPLICATION(S): 100000 CREAM TOPICAL at 05:37

## 2017-09-30 RX ADMIN — ENOXAPARIN SODIUM 40 MILLIGRAM(S): 100 INJECTION SUBCUTANEOUS at 05:36

## 2017-09-30 RX ADMIN — Medication 500 MILLIGRAM(S): at 12:20

## 2017-09-30 RX ADMIN — Medication 650 MILLIGRAM(S): at 05:37

## 2017-09-30 RX ADMIN — Medication 100 MILLIGRAM(S): at 05:38

## 2017-09-30 RX ADMIN — Medication 1 TABLET(S): at 05:36

## 2017-09-30 RX ADMIN — AMLODIPINE BESYLATE 2.5 MILLIGRAM(S): 2.5 TABLET ORAL at 06:44

## 2017-09-30 RX ADMIN — Medication 650 MILLIGRAM(S): at 06:36

## 2017-09-30 RX ADMIN — Medication 1 TABLET(S): at 17:31

## 2017-09-30 RX ADMIN — Medication 3 MILLIGRAM(S): at 21:37

## 2017-09-30 RX ADMIN — Medication 650 MILLIGRAM(S): at 22:27

## 2017-09-30 RX ADMIN — PANTOPRAZOLE SODIUM 40 MILLIGRAM(S): 20 TABLET, DELAYED RELEASE ORAL at 05:39

## 2017-09-30 RX ADMIN — Medication 1 TABLET(S): at 12:20

## 2017-09-30 RX ADMIN — Medication 650 MILLIGRAM(S): at 21:37

## 2017-09-30 NOTE — PROGRESS NOTE ADULT - ASSESSMENT
86 year old  female with a PMH of Diverticula of the colon, Macular degeneration, Dementia, and arthritis arrived in the ED complaining left hip pain. She lives at a assisted living home where she was found on the ground after a fall.   X-ray showed a L femoral neck fracture on her left side.   She underwent ORIF on 9/14.      > L hip fracture s/p ORIF -  pain control.   Ct PT  > labile BP - without h/o HTN - would ct to monitor off norvasc - pt asymptomatic with low BP episodes. afebrile. SBP - 150-160s are ok.  > Mild Dementia - stable.  Supportive care.  > Chronic diastolic CHF - clinically euvolemic, off diuretics   > severe protein calorie malnutrition - dietary supplement.

## 2017-09-30 NOTE — PROGRESS NOTE ADULT - SUBJECTIVE AND OBJECTIVE BOX
MARITA LY    [id number]    86y      Female    CC: labile BP on norvasc. admitted for hip fracture,    HPI: 86 year old  female with a PMH of Diverticula of the colon, Macular degeneration, Dementia, and arthritis admitted s/p fall and  left hip pain.   X-ray showed a L femoral neck fracture on her left side.   She underwent ORIF on 9/14. Was discharged to rehab on 09/18.  Doing well. pain controlled with tylenol.  no complaints today      INTERVAL HPI/OVERNIGHT EVENTS: no acute events    ROS: CVS - no light headed/dizziness        Vital Signs Last 24 Hrs  T(C): 36.3 (30 Sep 2017 06:46), Max: 36.3 (30 Sep 2017 06:46)  T(F): 97.4 (30 Sep 2017 06:46), Max: 97.4 (30 Sep 2017 06:46)  HR: 86 (30 Sep 2017 07:57) (73 - 86)  BP: 129/77 (30 Sep 2017 07:57) (129/77 - 160/70)  BP(mean): --  RR: 16 (30 Sep 2017 07:57) (16 - 18)  SpO2: 97% (30 Sep 2017 07:57) (95% - 98%)      PHYSICAL EXAM:    GENERAL: NAD, well-groomed  HEENT: PERRL, +EOMI  EXTREMITIES: No clubbing, cyanosis, or edema  NEURO: AAOX3          09-29 @ 07:01  -  09-30 @ 07:00  --------------------------------------------------------  IN: 0 mL / OUT: 1 mL / NET: -1 mL        LABS:                  MEDICATIONS  (STANDING):  pantoprazole    Tablet 40 milliGRAM(s) Oral before breakfast  calcium carbonate  625 mG + Vitamin D (OsCal 250 + D) 1 Tablet(s) Oral two times a day  enoxaparin Injectable 40 milliGRAM(s) SubCutaneous every 24 hours  multivitamin 1 Tablet(s) Oral daily  ascorbic acid 500 milliGRAM(s) Oral daily  melatonin. 3 milliGRAM(s) Oral at bedtime  nystatin Powder 1 Application(s) Topical two times a day  acetaminophen   Tablet. 650 milliGRAM(s) Oral at bedtime    MEDICATIONS  (PRN):  acetaminophen   Tablet. 650 milliGRAM(s) Oral every 6 hours PRN Mild Pain (1 - 3)  docusate sodium 100 milliGRAM(s) Oral two times a day PRN Constipation      RADIOLOGY & ADDITIONAL TESTS:

## 2017-09-30 NOTE — PROGRESS NOTE ADULT - SUBJECTIVE AND OBJECTIVE BOX
No overnight events.   Her pain level is 0, but does not go higer than 6.   Pain medications help.  Slept well.  No numbness.    REVIEW OF SYSTEMS  Constitutional - No fever,  No fatigue  HEENT - No vertigo, No neck pain  Neurological - No headaches, No memory loss, +loss of strength, No numbness, No tremors  Skin - No rashes, No lesions   Musculoskeletal - No joint pain, No joint swelling, No muscle pain  Psychiatric - No depression, No anxiety    VITALS  T(C): 36.3 (09-30-17 @ 06:46), Max: 36.3 (09-30-17 @ 06:46)  HR: 86 (09-30-17 @ 07:57) (73 - 112)  BP: 129/77 (09-30-17 @ 07:57) (101/65 - 160/70)  RR: 16 (09-30-17 @ 07:57) (16 - 18)  SpO2: 97% (09-30-17 @ 07:57) (95% - 98%)  Wt(kg): --      MEDICATIONS   acetaminophen   Tablet. 650 milliGRAM(s) every 6 hours PRN  magnesium hydroxide Suspension 30 milliLiter(s) daily PRN  pantoprazole    Tablet 40 milliGRAM(s) before breakfast  calcium carbonate  625 mG + Vitamin D (OsCal 250 + D) 1 Tablet(s) two times a day  enoxaparin Injectable 40 milliGRAM(s) every 24 hours  multivitamin 1 Tablet(s) daily  ascorbic acid 500 milliGRAM(s) daily  melatonin. 3 milliGRAM(s) at bedtime  nystatin Powder 1 Application(s) two times a day  senna 2 Tablet(s) at bedtime PRN  docusate sodium 100 milliGRAM(s) two times a day PRN  acetaminophen   Tablet. 650 milliGRAM(s) at bedtime    ---------  PHYSICAL EXAM  Constitutional - NAD, Comfortable  Pulm - Breathing comfortably, No wheezing  Abd - Soft, NTND  Extremities - No C/C/E, No calf tenderness  Neurologic Exam -                    Cognitive - AAOx3     Communication - Fluent     Motor - Left LE weakness due to pain     Sensory - Intact to LT  Psychiatric - Mood WNL, Affect WNL    ASSESSMENT/PLAN  86y Female with functional deficits after left hip fracture  Gi - Protonix, Colace PRN  Pain - Tylenol PRN  DVT PPX - Lovenox    Continue 3hrs a day of comprehensive rehab program.

## 2017-10-01 PROCEDURE — 99232 SBSQ HOSP IP/OBS MODERATE 35: CPT

## 2017-10-01 RX ADMIN — Medication 650 MILLIGRAM(S): at 05:04

## 2017-10-01 RX ADMIN — NYSTATIN CREAM 1 APPLICATION(S): 100000 CREAM TOPICAL at 05:04

## 2017-10-01 RX ADMIN — Medication 650 MILLIGRAM(S): at 05:40

## 2017-10-01 RX ADMIN — Medication 3 MILLIGRAM(S): at 21:01

## 2017-10-01 RX ADMIN — Medication 650 MILLIGRAM(S): at 12:16

## 2017-10-01 RX ADMIN — Medication 1 TABLET(S): at 12:16

## 2017-10-01 RX ADMIN — Medication 650 MILLIGRAM(S): at 21:40

## 2017-10-01 RX ADMIN — PANTOPRAZOLE SODIUM 40 MILLIGRAM(S): 20 TABLET, DELAYED RELEASE ORAL at 05:04

## 2017-10-01 RX ADMIN — Medication 500 MILLIGRAM(S): at 12:16

## 2017-10-01 RX ADMIN — Medication 1 TABLET(S): at 17:26

## 2017-10-01 RX ADMIN — Medication 1 TABLET(S): at 05:04

## 2017-10-01 RX ADMIN — Medication 650 MILLIGRAM(S): at 13:16

## 2017-10-01 RX ADMIN — ENOXAPARIN SODIUM 40 MILLIGRAM(S): 100 INJECTION SUBCUTANEOUS at 05:04

## 2017-10-01 RX ADMIN — Medication 650 MILLIGRAM(S): at 21:01

## 2017-10-01 NOTE — PROGRESS NOTE ADULT - SUBJECTIVE AND OBJECTIVE BOX
No overnight events.   Slept well.  Did well in therapy.  Pain is controlled.   Headache near resolved.     REVIEW OF SYSTEMS  Constitutional - No fever,  No fatigue  HEENT - No vertigo, No neck pain  Neurological - No headaches, +memory loss, +loss of strength, No numbness, No tremors  Skin - No rashes, No lesions   Musculoskeletal - +joint pain, No joint swelling, No muscle pain  Psychiatric - No depression, No anxiety    VITALS  T(C): 36.3 (10-01-17 @ 05:04), Max: 37.1 (09-30-17 @ 20:28)  HR: 68 (10-01-17 @ 05:04) (64 - 68)  BP: 122/80 (10-01-17 @ 05:04) (122/80 - 140/79)  RR: 18 (10-01-17 @ 05:04) (16 - 18)  SpO2: 95% (10-01-17 @ 05:04) (95% - 96%)  Wt(kg): --    MEDICATIONS   acetaminophen   Tablet. 650 milliGRAM(s) every 6 hours PRN  pantoprazole    Tablet 40 milliGRAM(s) before breakfast  calcium carbonate  625 mG + Vitamin D (OsCal 250 + D) 1 Tablet(s) two times a day  enoxaparin Injectable 40 milliGRAM(s) every 24 hours  multivitamin 1 Tablet(s) daily  ascorbic acid 500 milliGRAM(s) daily  melatonin. 3 milliGRAM(s) at bedtime  nystatin Powder 1 Application(s) two times a day  docusate sodium 100 milliGRAM(s) two times a day PRN  acetaminophen   Tablet. 650 milliGRAM(s) at bedtime      --------  PHYSICAL EXAM  Constitutional - NAD, Comfortable  Pulm - Breathing comfortably, No wheezing  Abd - Soft, NTND  Extremities - No C/C/E, No calf tenderness  Neurologic Exam -                    Cognitive - AAOx3     Communication - Fluent     Motor - Left LE weakness due to pain     Sensory - Intact to LT  Psychiatric - Mood WNL, Affect WNL    ASSESSMENT/PLAN  86y Female with functional deficits after left hip fracture  GI - Protonix, Colace PRN  Pain - Tylenol PRN  DVT PPX - Lovenox    Continue 3hrs a day of comprehensive rehab program.

## 2017-10-02 LAB
ANION GAP SERPL CALC-SCNC: 14 MMOL/L — SIGNIFICANT CHANGE UP (ref 5–17)
BUN SERPL-MCNC: 19 MG/DL — SIGNIFICANT CHANGE UP (ref 8–20)
CALCIUM SERPL-MCNC: 9.4 MG/DL — SIGNIFICANT CHANGE UP (ref 8.6–10.2)
CHLORIDE SERPL-SCNC: 96 MMOL/L — LOW (ref 98–107)
CO2 SERPL-SCNC: 24 MMOL/L — SIGNIFICANT CHANGE UP (ref 22–29)
CREAT SERPL-MCNC: 1.05 MG/DL — SIGNIFICANT CHANGE UP (ref 0.5–1.3)
GLUCOSE SERPL-MCNC: 93 MG/DL — SIGNIFICANT CHANGE UP (ref 70–115)
HCT VFR BLD CALC: 34 % — LOW (ref 37–47)
HGB BLD-MCNC: 11.6 G/DL — LOW (ref 12–16)
MCHC RBC-ENTMCNC: 30.9 PG — SIGNIFICANT CHANGE UP (ref 27–31)
MCHC RBC-ENTMCNC: 34.1 G/DL — SIGNIFICANT CHANGE UP (ref 32–36)
MCV RBC AUTO: 90.4 FL — SIGNIFICANT CHANGE UP (ref 81–99)
PLATELET # BLD AUTO: 576 K/UL — HIGH (ref 150–400)
POTASSIUM SERPL-MCNC: 4.3 MMOL/L — SIGNIFICANT CHANGE UP (ref 3.5–5.3)
POTASSIUM SERPL-SCNC: 4.3 MMOL/L — SIGNIFICANT CHANGE UP (ref 3.5–5.3)
PREALB SERPL-MCNC: 17 MG/DL — LOW (ref 18–38)
RBC # BLD: 3.76 M/UL — LOW (ref 4.4–5.2)
RBC # FLD: 14.5 % — SIGNIFICANT CHANGE UP (ref 11–15.6)
SODIUM SERPL-SCNC: 134 MMOL/L — LOW (ref 135–145)
WBC # BLD: 8.1 K/UL — SIGNIFICANT CHANGE UP (ref 4.8–10.8)
WBC # FLD AUTO: 8.1 K/UL — SIGNIFICANT CHANGE UP (ref 4.8–10.8)

## 2017-10-02 PROCEDURE — 99232 SBSQ HOSP IP/OBS MODERATE 35: CPT

## 2017-10-02 PROCEDURE — 99233 SBSQ HOSP IP/OBS HIGH 50: CPT

## 2017-10-02 RX ORDER — SODIUM CHLORIDE 9 MG/ML
1000 INJECTION INTRAMUSCULAR; INTRAVENOUS; SUBCUTANEOUS ONCE
Qty: 0 | Refills: 0 | Status: COMPLETED | OUTPATIENT
Start: 2017-10-02 | End: 2017-10-02

## 2017-10-02 RX ADMIN — Medication 500 MILLIGRAM(S): at 11:01

## 2017-10-02 RX ADMIN — Medication 650 MILLIGRAM(S): at 05:10

## 2017-10-02 RX ADMIN — Medication 650 MILLIGRAM(S): at 18:50

## 2017-10-02 RX ADMIN — Medication 1 TABLET(S): at 11:01

## 2017-10-02 RX ADMIN — NYSTATIN CREAM 1 APPLICATION(S): 100000 CREAM TOPICAL at 17:03

## 2017-10-02 RX ADMIN — Medication 650 MILLIGRAM(S): at 06:00

## 2017-10-02 RX ADMIN — NYSTATIN CREAM 1 APPLICATION(S): 100000 CREAM TOPICAL at 05:11

## 2017-10-02 RX ADMIN — ENOXAPARIN SODIUM 40 MILLIGRAM(S): 100 INJECTION SUBCUTANEOUS at 05:10

## 2017-10-02 RX ADMIN — Medication 3 MILLIGRAM(S): at 21:17

## 2017-10-02 RX ADMIN — Medication 650 MILLIGRAM(S): at 21:17

## 2017-10-02 RX ADMIN — Medication 650 MILLIGRAM(S): at 19:50

## 2017-10-02 RX ADMIN — SODIUM CHLORIDE 100 MILLILITER(S): 9 INJECTION INTRAMUSCULAR; INTRAVENOUS; SUBCUTANEOUS at 10:53

## 2017-10-02 RX ADMIN — Medication 1 TABLET(S): at 05:11

## 2017-10-02 RX ADMIN — Medication 1 TABLET(S): at 17:03

## 2017-10-02 RX ADMIN — Medication 650 MILLIGRAM(S): at 22:10

## 2017-10-02 RX ADMIN — PANTOPRAZOLE SODIUM 40 MILLIGRAM(S): 20 TABLET, DELAYED RELEASE ORAL at 05:10

## 2017-10-02 NOTE — PROGRESS NOTE ADULT - SUBJECTIVE AND OBJECTIVE BOX
No overnight events.   Patient is having limited progress for her to return to her assisted living.   Planning a JOHANNE today for ARNULFO.   Patient denies pain. Slept well.   GFR is elevated in setting of hyponatremia. Will bolus one liter of therapy.     REVIEW OF SYSTEMS  Constitutional - No fever,  +fatigue  HEENT - No vertigo, No neck pain  Neurological - No headaches, No memory loss, +loss of strength, No numbness, No tremors  Skin - No rashes, No lesions   Musculoskeletal - No joint pain, No joint swelling, No muscle pain  Psychiatric - No depression, No anxiety    VITALS  T(C): 36.4 (10-02-17 @ 05:48), Max: 36.8 (10-01-17 @ 21:02)  HR: 69 (10-02-17 @ 05:48) (69 - 70)  BP: 144/69 (10-02-17 @ 05:48) (115/68 - 144/69)  RR: 18 (10-02-17 @ 05:48) (17 - 18)  SpO2: 97% (10-02-17 @ 05:48) (96% - 98%)  Wt(kg): --    MEDICATIONS   acetaminophen   Tablet. 650 milliGRAM(s) every 6 hours PRN  pantoprazole    Tablet 40 milliGRAM(s) before breakfast  calcium carbonate  625 mG + Vitamin D (OsCal 250 + D) 1 Tablet(s) two times a day  enoxaparin Injectable 40 milliGRAM(s) every 24 hours  multivitamin 1 Tablet(s) daily  ascorbic acid 500 milliGRAM(s) daily  melatonin. 3 milliGRAM(s) at bedtime  nystatin Powder 1 Application(s) two times a day  docusate sodium 100 milliGRAM(s) two times a day PRN  acetaminophen   Tablet. 650 milliGRAM(s) at bedtime      RECENT LABS/IMAGING  CBC Full  -  ( 02 Oct 2017 07:35 )  WBC Count : 8.1 K/uL  Hemoglobin : 11.6 g/dL  Hematocrit : 34.0 %  Platelet Count - Automated : 576 K/uL  Mean Cell Volume : 90.4 fl  Mean Cell Hemoglobin : 30.9 pg  Mean Cell Hemoglobin Concentration : 34.1 g/dL  Auto Neutrophil # : x  Auto Lymphocyte # : x  Auto Monocyte # : x  Auto Eosinophil # : x  Auto Basophil # : x  Auto Neutrophil % : x  Auto Lymphocyte % : x  Auto Monocyte % : x  Auto Eosinophil % : x  Auto Basophil % : x    10-02    134<L>  |  96<L>  |  19.0  ----------------------------<  93  4.3   |  24.0  |  1.05    Ca    9.4      02 Oct 2017 07:33    Prealb 10/2 - 17    ----  PHYSICAL EXAM  Constitutional - NAD, Comfortable  Pulm - Breathing comfortably, No wheezing  Abd - Soft, NTND  Extremities - No C/C/E, No calf tenderness  Neurologic Exam -                    Cognitive - AAOx3     Communication - Fluent     Motor - Left LE weakness due to pain     Sensory - Intact to LT  Psychiatric - Mood WNL, Affect WNL    ASSESSMENT/PLAN  86y Female with functional deficits after left hip fracture  GI - Protonix, Colace PRN  Pain - Tylenol PRN  DVT PPX - Lovenox  Elevated GFR/HypoNa+ - NS x1L (10/2)    Continue 3hrs a day of comprehensive rehab program.

## 2017-10-02 NOTE — PROGRESS NOTE ADULT - ASSESSMENT
86 year old  female with a PMH of Diverticula of the colon, Macular degeneration, Dementia, and arthritis arrived in the ED complaining left hip pain. She lives at a assisted living home where she was found on the ground after a fall.   X-ray showed a L femoral neck fracture on her left side.   She underwent ORIF on 9/14.      > L hip fracture s/p ORIF -  pain control.   Ct PT  > labile BP - without h/o HTN - would ct to monitor off norvasc - pt asymptomatic with low BP episodes. afebrile. SBP - 150-160s are ok.  > CKD-3 - monitor labs weekly, avoid nephrotoxic drugs. maintain po hydration  > Mild Dementia - stable.  Supportive care.  > Chronic diastolic CHF - clinically euvolemic, off diuretics   > severe protein calorie malnutrition - dietary supplement.    We will sign off. Please call with any questions.

## 2017-10-02 NOTE — PROGRESS NOTE ADULT - SUBJECTIVE AND OBJECTIVE BOX
MARITA LY    [id number]    86y      Female    CC: labile BP on norvasc. admitted for hip fracture,    HPI: 86 year old  female with a PMH of Diverticula of the colon, Macular degeneration, Dementia, and arthritis admitted s/p fall and  left hip pain.   X-ray showed a L femoral neck fracture on her left side.   She underwent ORIF on 9/14. Was discharged to rehab on 09/18.  Doing well. pain controlled with tylenol.  no complaints, doing well.       INTERVAL HPI/OVERNIGHT EVENTS: no acute events    ROS: CVS - no light headed/dizziness      Vital Signs Last 24 Hrs  T(C): 36.4 (02 Oct 2017 05:48), Max: 36.8 (01 Oct 2017 21:02)  T(F): 97.6 (02 Oct 2017 05:48), Max: 98.2 (01 Oct 2017 21:02)  HR: 69 (02 Oct 2017 05:48) (69 - 70)  BP: 144/69 (02 Oct 2017 05:48) (115/68 - 144/69)  BP(mean): --  RR: 18 (02 Oct 2017 05:48) (17 - 18)  SpO2: 97% (02 Oct 2017 05:48) (96% - 98%)      PHYSICAL EXAM:    GENERAL: NAD, well-groomed  HEENT: PERRL, +EOMI  EXTREMITIES: No clubbing, cyanosis, or edema  NEURO: AAOX3          LABS:                        11.6   8.1   )-----------( 576      ( 02 Oct 2017 07:35 )             34.0     10-02    134<L>  |  96<L>  |  19.0  ----------------------------<  93  4.3   |  24.0  |  1.05    Ca    9.4      02 Oct 2017 07:33              MEDICATIONS  (STANDING):  pantoprazole    Tablet 40 milliGRAM(s) Oral before breakfast  calcium carbonate  625 mG + Vitamin D (OsCal 250 + D) 1 Tablet(s) Oral two times a day  enoxaparin Injectable 40 milliGRAM(s) SubCutaneous every 24 hours  multivitamin 1 Tablet(s) Oral daily  ascorbic acid 500 milliGRAM(s) Oral daily  melatonin. 3 milliGRAM(s) Oral at bedtime  nystatin Powder 1 Application(s) Topical two times a day  acetaminophen   Tablet. 650 milliGRAM(s) Oral at bedtime    MEDICATIONS  (PRN):  acetaminophen   Tablet. 650 milliGRAM(s) Oral every 6 hours PRN Mild Pain (1 - 3)  docusate sodium 100 milliGRAM(s) Oral two times a day PRN Constipation      RADIOLOGY & ADDITIONAL TESTS:

## 2017-10-03 LAB
ANION GAP SERPL CALC-SCNC: 12 MMOL/L — SIGNIFICANT CHANGE UP (ref 5–17)
BUN SERPL-MCNC: 21 MG/DL — HIGH (ref 8–20)
CALCIUM SERPL-MCNC: 9.4 MG/DL — SIGNIFICANT CHANGE UP (ref 8.6–10.2)
CHLORIDE SERPL-SCNC: 103 MMOL/L — SIGNIFICANT CHANGE UP (ref 98–107)
CO2 SERPL-SCNC: 26 MMOL/L — SIGNIFICANT CHANGE UP (ref 22–29)
CREAT SERPL-MCNC: 1.07 MG/DL — SIGNIFICANT CHANGE UP (ref 0.5–1.3)
GLUCOSE SERPL-MCNC: 87 MG/DL — SIGNIFICANT CHANGE UP (ref 70–115)
POTASSIUM SERPL-MCNC: 5 MMOL/L — SIGNIFICANT CHANGE UP (ref 3.5–5.3)
POTASSIUM SERPL-SCNC: 5 MMOL/L — SIGNIFICANT CHANGE UP (ref 3.5–5.3)
SODIUM SERPL-SCNC: 141 MMOL/L — SIGNIFICANT CHANGE UP (ref 135–145)

## 2017-10-03 PROCEDURE — 99233 SBSQ HOSP IP/OBS HIGH 50: CPT

## 2017-10-03 RX ADMIN — Medication 650 MILLIGRAM(S): at 21:16

## 2017-10-03 RX ADMIN — Medication 1 TABLET(S): at 05:07

## 2017-10-03 RX ADMIN — NYSTATIN CREAM 1 APPLICATION(S): 100000 CREAM TOPICAL at 17:39

## 2017-10-03 RX ADMIN — PANTOPRAZOLE SODIUM 40 MILLIGRAM(S): 20 TABLET, DELAYED RELEASE ORAL at 05:07

## 2017-10-03 RX ADMIN — Medication 650 MILLIGRAM(S): at 15:35

## 2017-10-03 RX ADMIN — Medication 3 MILLIGRAM(S): at 21:16

## 2017-10-03 RX ADMIN — Medication 1 TABLET(S): at 17:39

## 2017-10-03 RX ADMIN — Medication 1 TABLET(S): at 11:35

## 2017-10-03 RX ADMIN — ENOXAPARIN SODIUM 40 MILLIGRAM(S): 100 INJECTION SUBCUTANEOUS at 05:06

## 2017-10-03 RX ADMIN — NYSTATIN CREAM 1 APPLICATION(S): 100000 CREAM TOPICAL at 05:06

## 2017-10-03 RX ADMIN — Medication 500 MILLIGRAM(S): at 11:35

## 2017-10-03 RX ADMIN — Medication 650 MILLIGRAM(S): at 15:05

## 2017-10-03 RX ADMIN — Medication 650 MILLIGRAM(S): at 22:15

## 2017-10-03 NOTE — PROGRESS NOTE ADULT - ATTENDING COMMENTS
Hyponatremia resolved, GFR improved.   Continue PO hydration.
repeat BMP in AM.
Will no further follow , please recall as needed .

## 2017-10-03 NOTE — PROGRESS NOTE ADULT - SUBJECTIVE AND OBJECTIVE BOX
Patient doing well.   Her headaches have resolved  Has intermittent stiffness and soar ness of the left hip.  Would like to return to her assisted living, but understands that she needs to work more.     REVIEW OF SYSTEMS  Constitutional - No fever,  +fatigue  HEENT - No vertigo, No neck pain  Neurological - No headaches, No memory loss, +loss of strength, No numbness, No tremors  Skin - No rashes, No lesions   Musculoskeletal - +joint pain, No joint swelling, No muscle pain  Psychiatric - No depression, No anxiety    VITALS  T(C): 36.4 (10-03-17 @ 15:11), Max: 36.7 (10-02-17 @ 20:23)  HR: 100 (10-03-17 @ 15:11) (66 - 100)  BP: 115/75 (10-03-17 @ 15:11) (112/70 - 151/84)  RR: 16 (10-03-17 @ 15:11) (16 - 18)  SpO2: 99% (10-03-17 @ 11:31) (97% - 99%)  Wt(kg): --    MEDICATIONS   acetaminophen   Tablet. 650 milliGRAM(s) every 6 hours PRN  acetaminophen   Tablet. 650 milliGRAM(s) at bedtime  ascorbic acid 500 milliGRAM(s) daily  calcium carbonate  625 mG + Vitamin D (OsCal 250 + D) 1 Tablet(s) two times a day  docusate sodium 100 milliGRAM(s) two times a day PRN  enoxaparin Injectable 40 milliGRAM(s) every 24 hours  melatonin. 3 milliGRAM(s) at bedtime  multivitamin 1 Tablet(s) daily  nystatin Powder 1 Application(s) two times a day  pantoprazole    Tablet 40 milliGRAM(s) before breakfast      RECENT LABS/IMAGING  CBC Full  -  ( 02 Oct 2017 07:35 )  WBC Count : 8.1 K/uL  Hemoglobin : 11.6 g/dL  Hematocrit : 34.0 %  Platelet Count - Automated : 576 K/uL  Mean Cell Volume : 90.4 fl  Mean Cell Hemoglobin : 30.9 pg  Mean Cell Hemoglobin Concentration : 34.1 g/dL  Auto Neutrophil # : x  Auto Lymphocyte # : x  Auto Monocyte # : x  Auto Eosinophil # : x  Auto Basophil # : x  Auto Neutrophil % : x  Auto Lymphocyte % : x  Auto Monocyte % : x  Auto Eosinophil % : x  Auto Basophil % : x    10-03    141  |  103  |  21.0<H>  ----------------------------<  87  5.0   |  26.0  |  1.07    Ca    9.4      03 Oct 2017 07:39        PHYSICAL EXAM  Constitutional - NAD, Comfortable  Pulm - Breathing comfortably, No wheezing  Abd - Soft, NTND  Extremities - No C/C/E, No calf tenderness  Neurologic Exam -                    Cognitive - AAOx3     Communication - Fluent     Motor - Left LE weakness due to pain     Sensory - Intact to LT  Psychiatric - Mood WNL, Affect WNL    ASSESSMENT/PLAN  86y Female with functional deficits after left hip fracture  GI - Protonix, Colace PRN  Pain - Tylenol PRN  DVT PPX - Lovenox  Elevated GFR - H2O 2L/day, NS x1L (10/2)    Continue 3hrs a day of comprehensive rehab program.

## 2017-10-03 NOTE — CHART NOTE - NSCHARTNOTEFT_GEN_A_CORE
Source: Patient [X]     Current Diet: Regular diet + Ensure Enlive BID    PO intake:  %  [X]     Source for PO intake [X] Patient  chart [X]   staff [X]    Current Weight: 52kg- No changes. Stable.     Pertinent Medications: MEDICATIONS  (STANDING):  acetaminophen   Tablet. 650 milliGRAM(s) Oral at bedtime  ascorbic acid 500 milliGRAM(s) Oral daily  calcium carbonate  625 mG + Vitamin D (OsCal 250 + D) 1 Tablet(s) Oral two times a day  enoxaparin Injectable 40 milliGRAM(s) SubCutaneous every 24 hours  melatonin. 3 milliGRAM(s) Oral at bedtime  multivitamin 1 Tablet(s) Oral daily  nystatin Powder 1 Application(s) Topical two times a day  pantoprazole    Tablet 40 milliGRAM(s) Oral before breakfast    MEDICATIONS  (PRN):  acetaminophen   Tablet. 650 milliGRAM(s) Oral every 6 hours PRN Mild Pain (1 - 3)  docusate sodium 100 milliGRAM(s) Oral two times a day PRN Constipation    Pertinent Labs: CBC Full  -  ( 02 Oct 2017 07:35 )  WBC Count : 8.1 K/uL  Hemoglobin : 11.6 g/dL  Hematocrit : 34.0 %  Platelet Count - Automated : 576 K/uL  Mean Cell Volume : 90.4 fl  Mean Cell Hemoglobin : 30.9 pg  Mean Cell Hemoglobin Concentration : 34.1 g/dL    Skin: Intact     Nutrition focused physical exam conducted - found signs of malnutrition [X]absent [ ]present        Estimated Needs:   [X] no change since previous assessment  [ ] recalculated:     Current Nutrition Diagnosis: Pt continues with increased nutrient needs related to increased physiologic demand as evidenced by s/p ORIF.    Spoke with Pt and RN who report Pt with good intake. Patient states she doesn't love Ensure and doesn't drink. However, RN reporting otherwise. (Pt with dementia) Weight stable- bed weight taken today.     Recommendations: Continue current diet + Supplements.     Monitoring and Evaluation:   [X] PO intake [X] Tolerance to diet prescription [X] Weights  [X] Follow up per protocol [X] Labs:

## 2017-10-04 VITALS
OXYGEN SATURATION: 98 % | SYSTOLIC BLOOD PRESSURE: 141 MMHG | HEART RATE: 88 BPM | TEMPERATURE: 98 F | DIASTOLIC BLOOD PRESSURE: 76 MMHG | RESPIRATION RATE: 18 BRPM

## 2017-10-04 PROCEDURE — 99238 HOSP IP/OBS DSCHRG MGMT 30/<: CPT

## 2017-10-04 RX ORDER — ASCORBIC ACID 60 MG
1 TABLET,CHEWABLE ORAL
Qty: 0 | Refills: 0 | COMMUNITY
Start: 2017-10-04

## 2017-10-04 RX ORDER — DOCUSATE SODIUM 100 MG
1 CAPSULE ORAL
Qty: 0 | Refills: 0 | COMMUNITY
Start: 2017-10-04

## 2017-10-04 RX ORDER — NYSTATIN CREAM 100000 [USP'U]/G
1 CREAM TOPICAL
Qty: 0 | Refills: 0 | COMMUNITY
Start: 2017-10-04

## 2017-10-04 RX ORDER — LANOLIN ALCOHOL/MO/W.PET/CERES
1 CREAM (GRAM) TOPICAL
Qty: 0 | Refills: 0 | COMMUNITY
Start: 2017-10-04

## 2017-10-04 RX ORDER — PANTOPRAZOLE SODIUM 20 MG/1
1 TABLET, DELAYED RELEASE ORAL
Qty: 0 | Refills: 0 | COMMUNITY
Start: 2017-10-04

## 2017-10-04 RX ORDER — ACETAMINOPHEN 500 MG
2 TABLET ORAL
Qty: 0 | Refills: 0 | COMMUNITY
Start: 2017-10-04

## 2017-10-04 RX ADMIN — Medication 650 MILLIGRAM(S): at 05:51

## 2017-10-04 RX ADMIN — Medication 1 TABLET(S): at 05:18

## 2017-10-04 RX ADMIN — NYSTATIN CREAM 1 APPLICATION(S): 100000 CREAM TOPICAL at 05:18

## 2017-10-04 RX ADMIN — ENOXAPARIN SODIUM 40 MILLIGRAM(S): 100 INJECTION SUBCUTANEOUS at 05:18

## 2017-10-04 RX ADMIN — PANTOPRAZOLE SODIUM 40 MILLIGRAM(S): 20 TABLET, DELAYED RELEASE ORAL at 05:18

## 2017-10-04 RX ADMIN — Medication 650 MILLIGRAM(S): at 05:19

## 2017-10-04 NOTE — PROGRESS NOTE ADULT - SUBJECTIVE AND OBJECTIVE BOX
Patient dong well this AM, patient is well controlled.  Aware of her DC to [facility] as patient needs more time. But feels she has accomplished significantly here.  Slept well.     REVIEW OF SYSTEMS  Constitutional - No fever,  No fatigue  HEENT - No vertigo, No neck pain  Neurological - No headaches, No memory loss, +loss of strength, No numbness, No tremors  Skin - No rashes, No lesions   Musculoskeletal - +joint pain, No joint swelling, No muscle pain  Psychiatric - No depression, No anxiety    VITALS  T(C): 36.5 (10-04-17 @ 05:11), Max: 36.7 (10-03-17 @ 11:31)  HR: 72 (10-04-17 @ 05:11) (72 - 100)  BP: 144/82 (10-04-17 @ 05:11) (115/75 - 147/78)  RR: 18 (10-04-17 @ 05:11) (16 - 18)  SpO2: 98% (10-04-17 @ 05:11) (97% - 99%)  Wt(kg): --    MEDICATIONS   acetaminophen   Tablet. 650 milliGRAM(s) every 6 hours PRN  acetaminophen   Tablet. 650 milliGRAM(s) at bedtime  ascorbic acid 500 milliGRAM(s) daily  calcium carbonate  625 mG + Vitamin D (OsCal 250 + D) 1 Tablet(s) two times a day  docusate sodium 100 milliGRAM(s) two times a day PRN  enoxaparin Injectable 40 milliGRAM(s) every 24 hours  melatonin. 3 milliGRAM(s) at bedtime  multivitamin 1 Tablet(s) daily  nystatin Powder 1 Application(s) two times a day  pantoprazole    Tablet 40 milliGRAM(s) before breakfast      RECENT LABS/IMAGING    10-03    141  |  103  |  21.0<H>  ----------------------------<  87  5.0   |  26.0  |  1.07    Ca    9.4      03 Oct 2017 07:39        PHYSICAL EXAM  Constitutional - NAD, Comfortable  Pulm - Breathing comfortably, No wheezing  Abd - Soft, NTND  Extremities - No C/C/E, No calf tenderness  Neurologic Exam -                    Cognitive - AAOx3     Communication - Fluent     Motor - Left LE weakness due to pain     Sensory - Intact to LT  Psychiatric - Mood WNL, Affect WNL    ASSESSMENT/PLAN  86y Female with functional deficits after left hip fracture  GI - Protonix, Colace PRN  Pain - Tylenol PRN  DVT PPX - Lovenox  Elevated GFR - H2O 2L/day, NS x1L (10/2)    Discharge to [facility] today.

## 2017-10-04 NOTE — PROGRESS NOTE ADULT - PROVIDER SPECIALTY LIST ADULT
Hospitalist
Rehab Medicine

## 2017-10-19 PROCEDURE — C1713: CPT

## 2017-10-19 PROCEDURE — 86900 BLOOD TYPING SEROLOGIC ABO: CPT

## 2017-10-19 PROCEDURE — 87086 URINE CULTURE/COLONY COUNT: CPT

## 2017-10-19 PROCEDURE — 96376 TX/PRO/DX INJ SAME DRUG ADON: CPT

## 2017-10-19 PROCEDURE — 83735 ASSAY OF MAGNESIUM: CPT

## 2017-10-19 PROCEDURE — 82306 VITAMIN D 25 HYDROXY: CPT

## 2017-10-19 PROCEDURE — 85730 THROMBOPLASTIN TIME PARTIAL: CPT

## 2017-10-19 PROCEDURE — 87493 C DIFF AMPLIFIED PROBE: CPT

## 2017-10-19 PROCEDURE — 36415 COLL VENOUS BLD VENIPUNCTURE: CPT

## 2017-10-19 PROCEDURE — 97110 THERAPEUTIC EXERCISES: CPT

## 2017-10-19 PROCEDURE — 82310 ASSAY OF CALCIUM: CPT

## 2017-10-19 PROCEDURE — 86850 RBC ANTIBODY SCREEN: CPT

## 2017-10-19 PROCEDURE — 93306 TTE W/DOPPLER COMPLETE: CPT

## 2017-10-19 PROCEDURE — 74177 CT ABD & PELVIS W/CONTRAST: CPT

## 2017-10-19 PROCEDURE — 87186 SC STD MICRODIL/AGAR DIL: CPT

## 2017-10-19 PROCEDURE — 80048 BASIC METABOLIC PNL TOTAL CA: CPT

## 2017-10-19 PROCEDURE — 83970 ASSAY OF PARATHORMONE: CPT

## 2017-10-19 PROCEDURE — 96375 TX/PRO/DX INJ NEW DRUG ADDON: CPT

## 2017-10-19 PROCEDURE — 85027 COMPLETE CBC AUTOMATED: CPT

## 2017-10-19 PROCEDURE — 99285 EMERGENCY DEPT VISIT HI MDM: CPT | Mod: 25

## 2017-10-19 PROCEDURE — 93005 ELECTROCARDIOGRAM TRACING: CPT

## 2017-10-19 PROCEDURE — 86901 BLOOD TYPING SEROLOGIC RH(D): CPT

## 2017-10-19 PROCEDURE — 80053 COMPREHEN METABOLIC PANEL: CPT

## 2017-10-19 PROCEDURE — 81001 URINALYSIS AUTO W/SCOPE: CPT

## 2017-10-19 PROCEDURE — 76000 FLUOROSCOPY <1 HR PHYS/QHP: CPT

## 2017-10-19 PROCEDURE — 84100 ASSAY OF PHOSPHORUS: CPT

## 2017-10-19 PROCEDURE — 85610 PROTHROMBIN TIME: CPT

## 2017-10-19 PROCEDURE — 83690 ASSAY OF LIPASE: CPT

## 2017-10-19 PROCEDURE — 97116 GAIT TRAINING THERAPY: CPT

## 2017-10-19 PROCEDURE — 96374 THER/PROPH/DIAG INJ IV PUSH: CPT | Mod: XU

## 2017-10-19 PROCEDURE — 73502 X-RAY EXAM HIP UNI 2-3 VIEWS: CPT

## 2017-10-19 PROCEDURE — 97163 PT EVAL HIGH COMPLEX 45 MIN: CPT

## 2017-10-19 PROCEDURE — 83605 ASSAY OF LACTIC ACID: CPT

## 2017-10-23 ENCOUNTER — OTHER (OUTPATIENT)
Age: 82
End: 2017-10-23

## 2017-10-24 ENCOUNTER — APPOINTMENT (OUTPATIENT)
Dept: ORTHOPEDIC SURGERY | Facility: CLINIC | Age: 82
End: 2017-10-24
Payer: MEDICARE

## 2017-10-24 DIAGNOSIS — S72.002D FRACTURE OF UNSPECIFIED PART OF NECK OF LEFT FEMUR, SUBSEQUENT ENCOUNTER FOR CLOSED FRACTURE WITH ROUTINE HEALING: ICD-10-CM

## 2017-10-24 PROCEDURE — 73502 X-RAY EXAM HIP UNI 2-3 VIEWS: CPT | Mod: LT

## 2017-10-24 PROCEDURE — 99024 POSTOP FOLLOW-UP VISIT: CPT

## 2017-12-19 PROCEDURE — 97530 THERAPEUTIC ACTIVITIES: CPT

## 2017-12-19 PROCEDURE — 97110 THERAPEUTIC EXERCISES: CPT

## 2017-12-19 PROCEDURE — 84134 ASSAY OF PREALBUMIN: CPT

## 2017-12-19 PROCEDURE — 97167 OT EVAL HIGH COMPLEX 60 MIN: CPT

## 2017-12-19 PROCEDURE — 36415 COLL VENOUS BLD VENIPUNCTURE: CPT

## 2017-12-19 PROCEDURE — G0515: CPT

## 2017-12-19 PROCEDURE — 80053 COMPREHEN METABOLIC PANEL: CPT

## 2017-12-19 PROCEDURE — 97116 GAIT TRAINING THERAPY: CPT

## 2017-12-19 PROCEDURE — 97163 PT EVAL HIGH COMPLEX 45 MIN: CPT

## 2017-12-19 PROCEDURE — 80048 BASIC METABOLIC PNL TOTAL CA: CPT

## 2017-12-19 PROCEDURE — 85027 COMPLETE CBC AUTOMATED: CPT

## 2017-12-19 PROCEDURE — 97535 SELF CARE MNGMENT TRAINING: CPT

## 2017-12-19 PROCEDURE — 97112 NEUROMUSCULAR REEDUCATION: CPT

## 2018-02-08 ENCOUNTER — INPATIENT (INPATIENT)
Facility: HOSPITAL | Age: 83
LOS: 3 days | Discharge: HOME | DRG: 481 | End: 2018-02-12
Attending: INTERNAL MEDICINE | Admitting: INTERNAL MEDICINE
Payer: MEDICARE

## 2018-02-08 VITALS
HEART RATE: 72 BPM | DIASTOLIC BLOOD PRESSURE: 111 MMHG | TEMPERATURE: 97 F | RESPIRATION RATE: 16 BRPM | OXYGEN SATURATION: 97 % | WEIGHT: 154.98 LBS | SYSTOLIC BLOOD PRESSURE: 192 MMHG

## 2018-02-08 DIAGNOSIS — S72.001A FRACTURE OF UNSPECIFIED PART OF NECK OF RIGHT FEMUR, INITIAL ENCOUNTER FOR CLOSED FRACTURE: ICD-10-CM

## 2018-02-08 LAB
ALBUMIN SERPL ELPH-MCNC: 3.8 G/DL — SIGNIFICANT CHANGE UP (ref 3.3–5.2)
ALP SERPL-CCNC: 75 U/L — SIGNIFICANT CHANGE UP (ref 40–120)
ALT FLD-CCNC: 14 U/L — SIGNIFICANT CHANGE UP
ANION GAP SERPL CALC-SCNC: 13 MMOL/L — SIGNIFICANT CHANGE UP (ref 5–17)
APPEARANCE UR: CLEAR — SIGNIFICANT CHANGE UP
APTT BLD: 31.1 SEC — SIGNIFICANT CHANGE UP (ref 27.5–37.4)
AST SERPL-CCNC: 25 U/L — SIGNIFICANT CHANGE UP
BACTERIA # UR AUTO: ABNORMAL
BASOPHILS # BLD AUTO: 0 K/UL — SIGNIFICANT CHANGE UP (ref 0–0.2)
BASOPHILS NFR BLD AUTO: 0.1 % — SIGNIFICANT CHANGE UP (ref 0–2)
BILIRUB SERPL-MCNC: 0.3 MG/DL — LOW (ref 0.4–2)
BILIRUB UR-MCNC: NEGATIVE — SIGNIFICANT CHANGE UP
BLD GP AB SCN SERPL QL: SIGNIFICANT CHANGE UP
BUN SERPL-MCNC: 20 MG/DL — SIGNIFICANT CHANGE UP (ref 8–20)
CALCIUM SERPL-MCNC: 9.6 MG/DL — SIGNIFICANT CHANGE UP (ref 8.6–10.2)
CHLORIDE SERPL-SCNC: 99 MMOL/L — SIGNIFICANT CHANGE UP (ref 98–107)
CO2 SERPL-SCNC: 26 MMOL/L — SIGNIFICANT CHANGE UP (ref 22–29)
COLOR SPEC: SIGNIFICANT CHANGE UP
CREAT SERPL-MCNC: 0.96 MG/DL — SIGNIFICANT CHANGE UP (ref 0.5–1.3)
DIFF PNL FLD: ABNORMAL
EOSINOPHIL # BLD AUTO: 0 K/UL — SIGNIFICANT CHANGE UP (ref 0–0.5)
EOSINOPHIL NFR BLD AUTO: 0.4 % — SIGNIFICANT CHANGE UP (ref 0–6)
EPI CELLS # UR: SIGNIFICANT CHANGE UP
GLUCOSE SERPL-MCNC: 104 MG/DL — SIGNIFICANT CHANGE UP (ref 70–115)
GLUCOSE UR QL: NEGATIVE MG/DL — SIGNIFICANT CHANGE UP
HCT VFR BLD CALC: 37.3 % — SIGNIFICANT CHANGE UP (ref 37–47)
HGB BLD-MCNC: 12.4 G/DL — SIGNIFICANT CHANGE UP (ref 12–16)
INR BLD: 0.99 RATIO — SIGNIFICANT CHANGE UP (ref 0.88–1.16)
KETONES UR-MCNC: NEGATIVE — SIGNIFICANT CHANGE UP
LEUKOCYTE ESTERASE UR-ACNC: ABNORMAL
LYMPHOCYTES # BLD AUTO: 2.2 K/UL — SIGNIFICANT CHANGE UP (ref 1–4.8)
LYMPHOCYTES # BLD AUTO: 21.7 % — SIGNIFICANT CHANGE UP (ref 20–55)
MCHC RBC-ENTMCNC: 30 PG — SIGNIFICANT CHANGE UP (ref 27–31)
MCHC RBC-ENTMCNC: 33.2 G/DL — SIGNIFICANT CHANGE UP (ref 32–36)
MCV RBC AUTO: 90.1 FL — SIGNIFICANT CHANGE UP (ref 81–99)
MONOCYTES # BLD AUTO: 0.8 K/UL — SIGNIFICANT CHANGE UP (ref 0–0.8)
MONOCYTES NFR BLD AUTO: 8.1 % — SIGNIFICANT CHANGE UP (ref 3–10)
NEUTROPHILS # BLD AUTO: 6.8 K/UL — SIGNIFICANT CHANGE UP (ref 1.8–8)
NEUTROPHILS NFR BLD AUTO: 69 % — SIGNIFICANT CHANGE UP (ref 37–73)
NITRITE UR-MCNC: NEGATIVE — SIGNIFICANT CHANGE UP
PH UR: 8 — SIGNIFICANT CHANGE UP (ref 5–8)
PLATELET # BLD AUTO: 327 K/UL — SIGNIFICANT CHANGE UP (ref 150–400)
POTASSIUM SERPL-MCNC: 5.1 MMOL/L — SIGNIFICANT CHANGE UP (ref 3.5–5.3)
POTASSIUM SERPL-SCNC: 5.1 MMOL/L — SIGNIFICANT CHANGE UP (ref 3.5–5.3)
PROT SERPL-MCNC: 7.8 G/DL — SIGNIFICANT CHANGE UP (ref 6.6–8.7)
PROT UR-MCNC: NEGATIVE MG/DL — SIGNIFICANT CHANGE UP
PROTHROM AB SERPL-ACNC: 10.9 SEC — SIGNIFICANT CHANGE UP (ref 9.8–12.7)
RBC # BLD: 4.14 M/UL — LOW (ref 4.4–5.2)
RBC # FLD: 14.8 % — SIGNIFICANT CHANGE UP (ref 11–15.6)
RBC CASTS # UR COMP ASSIST: SIGNIFICANT CHANGE UP /HPF (ref 0–4)
SODIUM SERPL-SCNC: 138 MMOL/L — SIGNIFICANT CHANGE UP (ref 135–145)
SP GR SPEC: 1.01 — SIGNIFICANT CHANGE UP (ref 1.01–1.02)
TYPE + AB SCN PNL BLD: SIGNIFICANT CHANGE UP
UROBILINOGEN FLD QL: NEGATIVE MG/DL — SIGNIFICANT CHANGE UP
WBC # BLD: 9.9 K/UL — SIGNIFICANT CHANGE UP (ref 4.8–10.8)
WBC # FLD AUTO: 9.9 K/UL — SIGNIFICANT CHANGE UP (ref 4.8–10.8)
WBC UR QL: ABNORMAL

## 2018-02-08 PROCEDURE — 93010 ELECTROCARDIOGRAM REPORT: CPT

## 2018-02-08 PROCEDURE — 99223 1ST HOSP IP/OBS HIGH 75: CPT | Mod: 57

## 2018-02-08 PROCEDURE — 72192 CT PELVIS W/O DYE: CPT | Mod: 26

## 2018-02-08 PROCEDURE — 73502 X-RAY EXAM HIP UNI 2-3 VIEWS: CPT | Mod: 26,RT

## 2018-02-08 PROCEDURE — 27235 TREAT THIGH FRACTURE: CPT | Mod: RT

## 2018-02-08 PROCEDURE — 99223 1ST HOSP IP/OBS HIGH 75: CPT | Mod: AI

## 2018-02-08 PROCEDURE — 76377 3D RENDER W/INTRP POSTPROCES: CPT | Mod: 26

## 2018-02-08 PROCEDURE — 99285 EMERGENCY DEPT VISIT HI MDM: CPT

## 2018-02-08 RX ORDER — MORPHINE SULFATE 50 MG/1
2 CAPSULE, EXTENDED RELEASE ORAL EVERY 4 HOURS
Qty: 0 | Refills: 0 | Status: DISCONTINUED | OUTPATIENT
Start: 2018-02-08 | End: 2018-02-08

## 2018-02-08 RX ORDER — ONDANSETRON 8 MG/1
4 TABLET, FILM COATED ORAL ONCE
Qty: 0 | Refills: 0 | Status: DISCONTINUED | OUTPATIENT
Start: 2018-02-08 | End: 2018-02-08

## 2018-02-08 RX ORDER — LABETALOL HCL 100 MG
10 TABLET ORAL ONCE
Qty: 0 | Refills: 0 | Status: COMPLETED | OUTPATIENT
Start: 2018-02-08 | End: 2018-02-08

## 2018-02-08 RX ORDER — CEFAZOLIN SODIUM 1 G
2000 VIAL (EA) INJECTION
Qty: 0 | Refills: 0 | Status: COMPLETED | OUTPATIENT
Start: 2018-02-09 | End: 2018-02-09

## 2018-02-08 RX ORDER — KETOROLAC TROMETHAMINE 30 MG/ML
15 SYRINGE (ML) INJECTION EVERY 6 HOURS
Qty: 0 | Refills: 0 | Status: DISCONTINUED | OUTPATIENT
Start: 2018-02-08 | End: 2018-02-09

## 2018-02-08 RX ORDER — HYDRALAZINE HCL 50 MG
10 TABLET ORAL EVERY 6 HOURS
Qty: 0 | Refills: 0 | Status: DISCONTINUED | OUTPATIENT
Start: 2018-02-08 | End: 2018-02-12

## 2018-02-08 RX ORDER — ASCORBIC ACID 60 MG
500 TABLET,CHEWABLE ORAL DAILY
Qty: 0 | Refills: 0 | Status: DISCONTINUED | OUTPATIENT
Start: 2018-02-08 | End: 2018-02-12

## 2018-02-08 RX ORDER — OXYCODONE HYDROCHLORIDE 5 MG/1
10 TABLET ORAL EVERY 4 HOURS
Qty: 0 | Refills: 0 | Status: DISCONTINUED | OUTPATIENT
Start: 2018-02-08 | End: 2018-02-12

## 2018-02-08 RX ORDER — OXYCODONE HYDROCHLORIDE 5 MG/1
5 TABLET ORAL
Qty: 0 | Refills: 0 | Status: DISCONTINUED | OUTPATIENT
Start: 2018-02-08 | End: 2018-02-12

## 2018-02-08 RX ORDER — SENNA PLUS 8.6 MG/1
2 TABLET ORAL AT BEDTIME
Qty: 0 | Refills: 0 | Status: DISCONTINUED | OUTPATIENT
Start: 2018-02-08 | End: 2018-02-12

## 2018-02-08 RX ORDER — PANTOPRAZOLE SODIUM 20 MG/1
40 TABLET, DELAYED RELEASE ORAL
Qty: 0 | Refills: 0 | Status: DISCONTINUED | OUTPATIENT
Start: 2018-02-08 | End: 2018-02-08

## 2018-02-08 RX ORDER — MORPHINE SULFATE 50 MG/1
2 CAPSULE, EXTENDED RELEASE ORAL ONCE
Qty: 0 | Refills: 0 | Status: DISCONTINUED | OUTPATIENT
Start: 2018-02-08 | End: 2018-02-08

## 2018-02-08 RX ORDER — LABETALOL HCL 100 MG
10 TABLET ORAL ONCE
Qty: 0 | Refills: 0 | Status: DISCONTINUED | OUTPATIENT
Start: 2018-02-08 | End: 2018-02-12

## 2018-02-08 RX ORDER — ONDANSETRON 8 MG/1
4 TABLET, FILM COATED ORAL EVERY 6 HOURS
Qty: 0 | Refills: 0 | Status: DISCONTINUED | OUTPATIENT
Start: 2018-02-08 | End: 2018-02-08

## 2018-02-08 RX ORDER — DOCUSATE SODIUM 100 MG
100 CAPSULE ORAL
Qty: 0 | Refills: 0 | Status: DISCONTINUED | OUTPATIENT
Start: 2018-02-08 | End: 2018-02-08

## 2018-02-08 RX ORDER — ENOXAPARIN SODIUM 100 MG/ML
40 INJECTION SUBCUTANEOUS DAILY
Qty: 0 | Refills: 0 | Status: DISCONTINUED | OUTPATIENT
Start: 2018-02-09 | End: 2018-02-12

## 2018-02-08 RX ORDER — FENTANYL CITRATE 50 UG/ML
50 INJECTION INTRAVENOUS
Qty: 0 | Refills: 0 | Status: DISCONTINUED | OUTPATIENT
Start: 2018-02-08 | End: 2018-02-08

## 2018-02-08 RX ORDER — LANOLIN ALCOHOL/MO/W.PET/CERES
1 CREAM (GRAM) TOPICAL AT BEDTIME
Qty: 0 | Refills: 0 | Status: DISCONTINUED | OUTPATIENT
Start: 2018-02-08 | End: 2018-02-12

## 2018-02-08 RX ORDER — SODIUM CHLORIDE 9 MG/ML
500 INJECTION, SOLUTION INTRAVENOUS
Qty: 0 | Refills: 0 | Status: DISCONTINUED | OUTPATIENT
Start: 2018-02-08 | End: 2018-02-10

## 2018-02-08 RX ORDER — ACETAMINOPHEN 500 MG
650 TABLET ORAL EVERY 6 HOURS
Qty: 0 | Refills: 0 | Status: DISCONTINUED | OUTPATIENT
Start: 2018-02-08 | End: 2018-02-12

## 2018-02-08 RX ORDER — DOCUSATE SODIUM 100 MG
100 CAPSULE ORAL
Qty: 0 | Refills: 0 | Status: DISCONTINUED | OUTPATIENT
Start: 2018-02-08 | End: 2018-02-12

## 2018-02-08 RX ORDER — PANTOPRAZOLE SODIUM 20 MG/1
40 TABLET, DELAYED RELEASE ORAL
Qty: 0 | Refills: 0 | Status: DISCONTINUED | OUTPATIENT
Start: 2018-02-08 | End: 2018-02-12

## 2018-02-08 RX ORDER — SODIUM CHLORIDE 9 MG/ML
1000 INJECTION INTRAMUSCULAR; INTRAVENOUS; SUBCUTANEOUS
Qty: 0 | Refills: 0 | Status: DISCONTINUED | OUTPATIENT
Start: 2018-02-08 | End: 2018-02-08

## 2018-02-08 RX ORDER — HYDRALAZINE HCL 50 MG
10 TABLET ORAL EVERY 6 HOURS
Qty: 0 | Refills: 0 | Status: DISCONTINUED | OUTPATIENT
Start: 2018-02-08 | End: 2018-02-08

## 2018-02-08 RX ORDER — ASCORBIC ACID 60 MG
500 TABLET,CHEWABLE ORAL DAILY
Qty: 0 | Refills: 0 | Status: DISCONTINUED | OUTPATIENT
Start: 2018-02-08 | End: 2018-02-08

## 2018-02-08 RX ORDER — LABETALOL HCL 100 MG
2 TABLET ORAL
Qty: 400 | Refills: 0 | Status: DISCONTINUED | OUTPATIENT
Start: 2018-02-08 | End: 2018-02-08

## 2018-02-08 RX ADMIN — SENNA PLUS 2 TABLET(S): 8.6 TABLET ORAL at 23:28

## 2018-02-08 RX ADMIN — MORPHINE SULFATE 2 MILLIGRAM(S): 50 CAPSULE, EXTENDED RELEASE ORAL at 23:26

## 2018-02-08 RX ADMIN — Medication 15 MILLIGRAM(S): at 20:18

## 2018-02-08 RX ADMIN — FENTANYL CITRATE 50 MICROGRAM(S): 50 INJECTION INTRAVENOUS at 19:00

## 2018-02-08 RX ADMIN — MORPHINE SULFATE 2 MILLIGRAM(S): 50 CAPSULE, EXTENDED RELEASE ORAL at 23:41

## 2018-02-08 RX ADMIN — Medication 650 MILLIGRAM(S): at 23:28

## 2018-02-08 RX ADMIN — OXYCODONE HYDROCHLORIDE 5 MILLIGRAM(S): 5 TABLET ORAL at 21:22

## 2018-02-08 RX ADMIN — FENTANYL CITRATE 50 MICROGRAM(S): 50 INJECTION INTRAVENOUS at 18:45

## 2018-02-08 RX ADMIN — Medication 15 MILLIGRAM(S): at 19:45

## 2018-02-08 RX ADMIN — FENTANYL CITRATE 50 MICROGRAM(S): 50 INJECTION INTRAVENOUS at 20:19

## 2018-02-08 RX ADMIN — MORPHINE SULFATE 2 MILLIGRAM(S): 50 CAPSULE, EXTENDED RELEASE ORAL at 13:38

## 2018-02-08 RX ADMIN — MORPHINE SULFATE 2 MILLIGRAM(S): 50 CAPSULE, EXTENDED RELEASE ORAL at 13:15

## 2018-02-08 NOTE — ED ADULT TRIAGE NOTE - CHIEF COMPLAINT QUOTE
BIBA< patient is awake and oriented times 3, complains of a fall from standing height, patient reports R hip pain, patient denies any head injury, denies any loc, denies any use of blood thinners BIBA< patient is awake and oriented times 3 with episodes of confusion, patient has a hx of dementia, complains of a fall from standing height, patient reports R hip pain, patient denies any head injury, denies any loc, denies any use of blood thinners

## 2018-02-08 NOTE — CONSULT NOTE ADULT - SUBJECTIVE AND OBJECTIVE BOX
Pt Name: MARITA LY    MRN: [id number]      Patient is a 86y Female presenting to the emergency department c/o right hip pain x today. Patient has hx of dementia and is a poor historian. Family states she was found on the floor in her assisted living. No other orthopedic complaints.    REVIEW OF SYSTEMS      General:	denies fever/chills    Respiratory and Thorax: denies SOB  	  Cardiovascular:	denies CP    Gastrointestinal:	 denies abd pain    Musculoskeletal:	 c/o right hip    Neurological:	denies numbness/tingling    PAST MEDICAL & SURGICAL HISTORY:  PAST MEDICAL & SURGICAL HISTORY:  Late onset Alzheimer's disease without behavioral disturbance  Diverticula of colon  Macular degeneration  Arthritis  No significant past surgical history      Allergies: No Known Allergies      FAMILY HISTORY:  No pertinent family history in first degree relatives  : non-contributory    Social History:     denies illicit drug use                          12.4   9.9   )-----------( 327      ( 08 Feb 2018 11:47 )             37.3     02-08    138  |  99  |  20.0  ----------------------------<  104  5.1   |  26.0  |  0.96    Ca    9.6      08 Feb 2018 11:47    TPro  7.8  /  Alb  3.8  /  TBili  0.3<L>  /  DBili  x   /  AST  25  /  ALT  14  /  AlkPhos  75  02-08      PHYSICAL EXAM:    Vital Signs Last 24 Hrs  T(C): 36.3 (08 Feb 2018 11:28), Max: 36.3 (08 Feb 2018 11:28)  T(F): 97.3 (08 Feb 2018 11:28), Max: 97.3 (08 Feb 2018 11:28)  HR: 72 (08 Feb 2018 11:28) (72 - 72)  BP: 192/111 (08 Feb 2018 11:28) (192/111 - 192/111)  BP(mean): --  RR: 16 (08 Feb 2018 11:28) (16 - 16)  SpO2: 97% (08 Feb 2018 11:28) (97% - 97%)  Daily     Daily     Appearance: Alert, responsive, in no acute distress.    Neurological: Sensation is grossly intact to light touch. 5/5 motor function of all extremities. No focal deficits or weaknesses found.    Skin: no rash on visible skin. Skin is clean, dry and intact. No bleeding. No abrasions. No ulcerations.    Vascular: 2+ distal pulses. Cap refill < 2 sec. No signs of venous insuffiency or stasis. No extremity ulcerations. No cyanosis.    Musculoskeletal:         Right Lower Extremity: + TTP right hip. skin in tact, no erythema. SILT. + dorsi/plantarflexion. DP 2+. Calf soft NT B/L.    Imaging Studies:    xray right hip/pelvis: + fem neck fx    A/P:  Pt is a  86y Female with right fem neck fx    PLAN:   *D/W Dr. Fagan  - F/U CT pelvis  - admit to medicine  - npo after midnight  - possible OR tomorrow

## 2018-02-08 NOTE — ED ADULT NURSE NOTE - CHIEF COMPLAINT QUOTE
BIBA< patient is awake and oriented times 3 with episodes of confusion, patient has a hx of dementia, complains of a fall from standing height, patient reports R hip pain, patient denies any head injury, denies any loc, denies any use of blood thinners

## 2018-02-08 NOTE — H&P ADULT - ASSESSMENT
Ms Canas, she is a 86 year old female with past medical H/O  Alzheimer's Dementia,  macular degeneration, currently a resident of assisted living facility " [facility] " and arthritis presents after a fall. Pt is unable to provide any History and most of the history obtained from pt's 2 daughters who are currently at bed side. Reportedly, pt was found on the floor at assisted Living and then they call 911 who brought her to hospital. In ER, work up showed Rt Femur neck fracture for which Orthopedics consulted, who is planning to take [facility] to [facility]    Acute Right sided Femur neck fracture: S/P Mechanical fall  consulted ortho: planning for OR [facility]  pain control  Bed rest  EKG: NSR with some PVC's  X ray chest Pending    Essential Hypertension: Pt with no H/O HTN  Suspect this from Pain from fracture  Give One time dose of IV labetalol and then use PRN IV HYDRALAZINE for SBP > 160    Dementia with Behavioural disturbance maintain fall precautions  start Zyprexa PRN for agitation after the surgery  High risk for agitation after anesthesia. Discussed  with family, and they understands it    DVT Prophylaxis: start SQ LOVENOX QD, once OK with surgery. Currently, on hold, as they are planning to take her to OR [facility]    Pt is ok to proceed with ortho surgery today. she doesn't have any Known H/O CAD, CHF and currently doesn't have any active chest pain or SOB. EKG NSR and all Labs look fine. Discussed risks with Both patient's daughters Teetee Elizalde ( [phone] ) and Inna Canas ( [phone] )

## 2018-02-08 NOTE — ED ADULT NURSE REASSESSMENT NOTE - NS ED NURSE REASSESS COMMENT FT1
pt status unchanged, refer to flowsheet and chart, pt safety maintained, pt hemodynamically stable, Surgeon took pt from CT scan to OR to repair right hip, OR aware and report given to RN

## 2018-02-08 NOTE — CONSULT NOTE ADULT - ATTENDING COMMENTS
Ortho Trauma Attending:  Agree with above PA note.  Note edited where necessary.      Maxi Fagan MD  Orthopaedic Trauma Surgery

## 2018-02-08 NOTE — BRIEF OPERATIVE NOTE - PRE-OP DX
Closed fracture of neck of right femur, initial encounter  02/08/2018  valgus impacted femoral neck fracture  Active  Maxi Fagan

## 2018-02-08 NOTE — BRIEF OPERATIVE NOTE - COMMENTS
post-op plan: protected WBAT, PT/OT,  LMWH per medicine (ok to hold given history of falls), ancef per SCIP, f/u ortho 3 weeks

## 2018-02-08 NOTE — ED ADULT NURSE NOTE - OBJECTIVE STATEMENT
pt c/o fall at [facility]. Pt had left hip fx repair in September 2017. Pt c/o roght hip pain after falling with walker

## 2018-02-08 NOTE — H&P ADULT - HISTORY OF PRESENT ILLNESS
Ms Canas, she is a 86 year old female with past medical H/O  Alzheimer's Dementia,  macular degeneration, currently a resident of assisted living facility " [facility] " and arthritis presents after a fall. Pt is unable to provide any History and most of the history obtained from pt's 2 daughters who are currently at bed side. Reportedly, pt was found on the floor at assisted Living and then they call 911 who brought her to hospital. In ER, work up showed Rt Femur neck fracture for which Orthopedics consulted, who is planning to take her [facility]  Denies Fevers, chills, nausea, vomiting

## 2018-02-08 NOTE — H&P ADULT - NSHPSOCIALHISTORY_GEN_ALL_CORE
Lives at the Assisted Living facility-[facility]    Denies smoking cigarettes  Denies drinking alcohol  Denies using recreational drugs    uses walker for walking at the assisted Living

## 2018-02-08 NOTE — BRIEF OPERATIVE NOTE - PROCEDURE
<<-----Click on this checkbox to enter Procedure Hip fracture pinning pathway  02/08/2018  CRPP right femoral neck fracture  Active  MLINN

## 2018-02-08 NOTE — H&P ADULT - NSHPPHYSICALEXAM_GEN_ALL_CORE
General appearance: NAD, Awake, Alert  HEENT: KRISTIN   Neck: Supple, No JVD, No tenderness  Lungs: Decreased Breath sounds B/L, No wheezes, No rales  Cardiovascular: S1S2, Regular rhythm  Abdomen: Soft, Nontender, Nondistended, No guarding/rebound, Positive bowel sounds  Extremities: No clubbing, No cyanosis, No edema, No calf tenderness  Neuro: DEMENTED, CONFUSED. Doesn't follow most of the commands   Skin: No new Rash  Psychiatric: confused, demented

## 2018-02-08 NOTE — PROGRESS NOTE ADULT - SUBJECTIVE AND OBJECTIVE BOX
Orthopedic PA Postop Note  Patient S/P RIGHT HIP PERCUTANEOUS PINNING  Patient in bed comfortable   RIGHT Leg  Dressing C/D/I - Moderate swelling at incision sight.  Pulse intact  Calf Soft NT  Compartments soft and compressible  Dorsi/Plantar Flexion Intact    Vital Signs Last 24 Hrs  T(C): 36.6 (08 Feb 2018 20:00), Max: 36.8 (08 Feb 2018 18:20)  T(F): 97.9 (08 Feb 2018 20:00), Max: 98.2 (08 Feb 2018 18:20)  HR: 88 (08 Feb 2018 21:30) (67 - 90)  BP: 154/71 (08 Feb 2018 21:30) (136/92 - 192/111)  BP(mean): --  RR: 15 (08 Feb 2018 21:30) (10 - 22)  SpO2: 96% (08 Feb 2018 21:30) (93% - 100%)      D/w Dr. Fagan regarding swelling at incision site. Ace wrap applied for compression, will continue to monitor.    A/P: 86F S/P RIGHT HIP PERCUTANEOUS PINNING  1. DVTP - LOVENOX  2. Physical Therapy  3. Pain Control as clinically indicated

## 2018-02-08 NOTE — ED PROVIDER NOTE - OBJECTIVE STATEMENT
86 year old female with a h/o Alzheimer's , macular degeneration and arthritis presents after a fall. pt was in her [facility] until a few hours ago when she tripped and fell landing on her right hip

## 2018-02-09 ENCOUNTER — TRANSCRIPTION ENCOUNTER (OUTPATIENT)
Age: 83
End: 2018-02-09

## 2018-02-09 LAB
HCT VFR BLD CALC: 28.6 % — LOW (ref 37–47)
HGB BLD-MCNC: 9.4 G/DL — LOW (ref 12–16)
MCHC RBC-ENTMCNC: 29.3 PG — SIGNIFICANT CHANGE UP (ref 27–31)
MCHC RBC-ENTMCNC: 32.9 G/DL — SIGNIFICANT CHANGE UP (ref 32–36)
MCV RBC AUTO: 89.1 FL — SIGNIFICANT CHANGE UP (ref 81–99)
PLATELET # BLD AUTO: 242 K/UL — SIGNIFICANT CHANGE UP (ref 150–400)
RBC # BLD: 3.21 M/UL — LOW (ref 4.4–5.2)
RBC # FLD: 14.6 % — SIGNIFICANT CHANGE UP (ref 11–15.6)
WBC # BLD: 11.3 K/UL — HIGH (ref 4.8–10.8)
WBC # FLD AUTO: 11.3 K/UL — HIGH (ref 4.8–10.8)

## 2018-02-09 PROCEDURE — 99232 SBSQ HOSP IP/OBS MODERATE 35: CPT

## 2018-02-09 RX ADMIN — ENOXAPARIN SODIUM 40 MILLIGRAM(S): 100 INJECTION SUBCUTANEOUS at 13:29

## 2018-02-09 RX ADMIN — Medication 650 MILLIGRAM(S): at 13:27

## 2018-02-09 RX ADMIN — Medication 650 MILLIGRAM(S): at 06:44

## 2018-02-09 RX ADMIN — Medication 15 MILLIGRAM(S): at 06:02

## 2018-02-09 RX ADMIN — Medication 650 MILLIGRAM(S): at 00:28

## 2018-02-09 RX ADMIN — Medication 1 MILLIGRAM(S): at 21:26

## 2018-02-09 RX ADMIN — Medication 1 TABLET(S): at 18:19

## 2018-02-09 RX ADMIN — Medication 1 TABLET(S): at 05:45

## 2018-02-09 RX ADMIN — SENNA PLUS 2 TABLET(S): 8.6 TABLET ORAL at 21:26

## 2018-02-09 RX ADMIN — Medication 100 MILLIGRAM(S): at 08:42

## 2018-02-09 RX ADMIN — Medication 500 MILLIGRAM(S): at 13:29

## 2018-02-09 RX ADMIN — PANTOPRAZOLE SODIUM 40 MILLIGRAM(S): 20 TABLET, DELAYED RELEASE ORAL at 05:47

## 2018-02-09 RX ADMIN — Medication 650 MILLIGRAM(S): at 05:45

## 2018-02-09 RX ADMIN — Medication 650 MILLIGRAM(S): at 23:52

## 2018-02-09 RX ADMIN — Medication 15 MILLIGRAM(S): at 05:47

## 2018-02-09 RX ADMIN — Medication 650 MILLIGRAM(S): at 13:55

## 2018-02-09 RX ADMIN — Medication 650 MILLIGRAM(S): at 18:20

## 2018-02-09 RX ADMIN — Medication 100 MILLIGRAM(S): at 00:32

## 2018-02-09 RX ADMIN — Medication 650 MILLIGRAM(S): at 19:00

## 2018-02-09 NOTE — PROGRESS NOTE ADULT - SUBJECTIVE AND OBJECTIVE BOX
MARITA LY  ----------------------------------------    CC:  Follow up visit for Rt Femur neck fracture    pleasantly confused  No family at bed side  denies pain, as long as she is not moving  post op hemoglobin stable      Vital Signs Last 24 Hrs    T(C): 36.9 (2018 10:19), Max: 36.9 (2018 10:19)  T(F): 98.5 (2018 10:19), Max: 98.5 (2018 10:19)  HR: 78 (2018 10:19) (64 - 90)  BP: 114/81 (2018 10:19) (114/81 - 187/95)  BP(mean): --  RR: 16 (2018 10:19) (10 - 22)  SpO2: 100% (2018 10:19) (93% - 100%)    CAPILLARY BLOOD GLUCOSE        PHYSICAL EXAMINATION:  ----------------------------------------    General appearance: NAD, Awake, Alert  HEENT: NCAT, Conjunctiva clear, EOMI  Neck: Supple, No JVD  Lungs: Clear to auscultation, Breath sound equal bilaterally  Cardiovascular: S1S2, Regular rhythm  Abdomen: Soft, Nontender, Positive bowel sounds  Extremities: No clubbing, No cyanosis, No edema  + Rt Hip surgery site covered with bandage  CNS: confused       LABORATORY STUDIES:  ----------------------------------------                        9.4    11.3  )-----------( 242      ( 2018 07:58 )             28.6     02-08    138  |  99  |  20.0  ----------------------------<  104  5.1   |  26.0  |  0.96    Ca    9.6      2018 11:47    TPro  7.8  /  Alb  3.8  /  TBili  0.3<L>  /  DBili  x   /  AST  25  /  ALT  14  /  AlkPhos  75  02-08    LIVER FUNCTIONS - ( 2018 11:47 )  Alb: 3.8 g/dL / Pro: 7.8 g/dL / ALK PHOS: 75 U/L / ALT: 14 U/L / AST: 25 U/L / GGT: x           PT/INR - ( 2018 11:47 )   PT: 10.9 sec;   INR: 0.99 ratio         PTT - ( 2018 11:47 )  PTT:31.1 sec      Urinalysis Basic - ( 2018 18:37 )    Color: Pale Yellow / Appearance: Clear / S.010 / pH: x  Gluc: x / Ketone: Negative  / Bili: Negative / Urobili: Negative mg/dL   Blood: x / Protein: Negative mg/dL / Nitrite: Negative   Leuk Esterase: Trace / RBC: 0-2 /HPF / WBC 26-50   Sq Epi: x / Non Sq Epi: Occasional / Bacteria: Few    Culture - Urine (collected 2018 18:39)  Source: .Urine Clean Catch (Midstream)  Preliminary Report (2018 09:20):    >100,000 CFU/ml Gram Negative Rods Identification and susceptibility to    follow.      MEDICATIONS  (STANDING):    acetaminophen   Tablet. 650 milliGRAM(s) Oral every 6 hours  ascorbic acid 500 milliGRAM(s) Oral daily  calcium carbonate  625 mG + Vitamin D (OsCal 250 + D) 1 Tablet(s) Oral every 12 hours  enoxaparin Injectable 40 milliGRAM(s) SubCutaneous daily  labetalol Injectable 10 milliGRAM(s) IV Push once  lactated ringers. 500 milliLiter(s) (80 mL/Hr) IV Continuous <Continuous>  melatonin 1 milliGRAM(s) Oral at bedtime  pantoprazole    Tablet 40 milliGRAM(s) Oral before breakfast  senna 2 Tablet(s) Oral at bedtime    MEDICATIONS  (PRN):  docusate sodium 100 milliGRAM(s) Oral two times a day PRN Constipation  hydrALAZINE Injectable 10 milliGRAM(s) IV Push every 6 hours PRN SBP > 160  oxyCODONE    IR 10 milliGRAM(s) Oral every 4 hours PRN Moderate Pain (4 - 6)  oxyCODONE    IR 5 milliGRAM(s) Oral every 3 hours PRN Mild Pain (1 - 3)      ASSESSMENT / PLAN:  ----------------------------------------

## 2018-02-09 NOTE — PHYSICAL THERAPY INITIAL EVALUATION ADULT - GENERAL OBSERVATIONS, REHAB EVAL
Pt received lying in bed, (+) ace wrap right thigh, (+) supplemental O2 via nasal cannula, (+) IV right UE, NAD. Alert and confused. Bed alarm activated. Agreeable to PT evaluation.

## 2018-02-09 NOTE — PROGRESS NOTE ADULT - ASSESSMENT
Ms Canas, she is a 86 year old female with past medical H/O  Alzheimer's Dementia,  macular degeneration, currently a resident of assisted living facility " [facility] " and arthritis presents after a fall. Pt is unable to provide any History and most of the history obtained from pt's 2 daughters who are currently at bed side. Reportedly, pt was found on the floor at assisted Living and then they call 911 who brought her to hospital. In ER, work up showed Rt Femur neck fracture for which Orthopedics consulted, who is planning to take her to OR later [facility] and [facility] consulted for admission    Acute Right sided Femur neck fracture: S/P Mechanical fall  consulted ortho:   s/p right hip percutaneous pinning POD#1,  pain control  PT/OT    Dementia with Behavioural disturbance maintain fall precautions   Zyprexa PRN for agitation     DVT Prophylaxis LOVENOX QD,     Disposition : Needs [facility] placement. spoke with social work and case management     Both patient's daughters Teetee Elizalde ( [phone] ) and Inna Canas ( [phone] )

## 2018-02-09 NOTE — PROGRESS NOTE ADULT - ASSESSMENT
POD # 1 s/p GA w/ LMA. Pt. A&Ox1-2, VSS. Denies any N/V and pain well controlled. No throat pain or hoarseness. No complications or questions r/t yest GA.

## 2018-02-09 NOTE — PROGRESS NOTE ADULT - SUBJECTIVE AND OBJECTIVE BOX
History:  Patient seen and eval at bedside. Patient has no complaints. Patient denies nausea, vomiting, chest pain, shortness of breath, abdominal pain or fever. No new complaints.     Vital Signs Last 24 Hrs  T(C): 36.4 (09 Feb 2018 00:07), Max: 36.8 (08 Feb 2018 18:20)  T(F): 97.6 (09 Feb 2018 00:07), Max: 98.2 (08 Feb 2018 18:20)  HR: 68 (09 Feb 2018 00:07) (64 - 90)  BP: 122/65 (09 Feb 2018 00:07) (122/65 - 192/111)  RR: 18 (09 Feb 2018 00:07) (10 - 22)  SpO2: 99% (09 Feb 2018 00:07) (93% - 100%)                          12.4   9.9   )-----------( 327      ( 08 Feb 2018 11:47 )             37.3     02-08    138  |  99  |  20.0  ----------------------------<  104  5.1   |  26.0  |  0.96    Physical exam: NAD, awake, alert  Right Lower extremity: The dressing is clean, dry and intact. No wound erythema, discharge, drainage is noted. EHL/TA/GS/FHL intact,  Sensation to light touch is grossly intact distally. No foot drop. 2+ dorsalis pedis pulse. Calf soft, NT B/L. No cyanosis. Thigh soft, compressible.    Primary Orthopedic Assessment: s/p right hip percutaneous pinning POD#1, Alzheimers    Plan:   ·	DVT prophylaxis as prescribed - ASA, including use of compression devices and ankle pumps  ·	Continue physical therapy: WBAT  ·	Pain control as clinically indicated  ·	Incentive spirometry encouraged  ·	Cont care as per primary team  ·	Discharge planning: likely rehab

## 2018-02-09 NOTE — PROGRESS NOTE ADULT - SUBJECTIVE AND OBJECTIVE BOX
Anesthesia Post Op Note:      INTERVAL HPI/OVERNIGHT EVENTS:    MEDICATIONS  (STANDING):  acetaminophen   Tablet. 650 milliGRAM(s) Oral every 6 hours  ascorbic acid 500 milliGRAM(s) Oral daily  calcium carbonate  625 mG + Vitamin D (OsCal 250 + D) 1 Tablet(s) Oral every 12 hours  enoxaparin Injectable 40 milliGRAM(s) SubCutaneous daily  labetalol Injectable 10 milliGRAM(s) IV Push once  lactated ringers. 500 milliLiter(s) (80 mL/Hr) IV Continuous <Continuous>  melatonin 1 milliGRAM(s) Oral at bedtime  pantoprazole    Tablet 40 milliGRAM(s) Oral before breakfast  senna 2 Tablet(s) Oral at bedtime    MEDICATIONS  (PRN):  docusate sodium 100 milliGRAM(s) Oral two times a day PRN Constipation  hydrALAZINE Injectable 10 milliGRAM(s) IV Push every 6 hours PRN SBP > 160  oxyCODONE    IR 10 milliGRAM(s) Oral every 4 hours PRN Moderate Pain (4 - 6)  oxyCODONE    IR 5 milliGRAM(s) Oral every 3 hours PRN Mild Pain (1 - 3)      Allergies    No Known Allergies    Intolerances        REVIEW OF SYSTEMS:    CONSTITUTIONAL: No fever, weight loss, or fatigue  EYES: No eye pain, visual disturbances, or discharge  ENMT:  No difficulty hearing, tinnitus, vertigo; No sinus or throat pain  NECK: No pain or stiffness  BREASTS: No pain, masses, or nipple discharge  RESPIRATORY: No cough, wheezing, chills or hemoptysis; No shortness of breath  CARDIOVASCULAR: No chest pain, palpitations, dizziness, or leg swelling  GASTROINTESTINAL: No abdominal or epigastric pain. No nausea, vomiting, or hematemesis; No diarrhea or constipation. No melena or hematochezia.  GENITOURINARY: No dysuria, frequency, hematuria, or incontinence  NEUROLOGICAL: No headaches, memory loss, loss of strength, numbness, or tremors  SKIN: No itching, burning, rashes, or lesions   LYMPH NODES: No enlarged glands  ENDOCRINE: No heat or cold intolerance; No hair loss  MUSCULOSKELETAL: No joint pain or swelling; No muscle, back, or extremity pain  PSYCHIATRIC: No depression, anxiety, mood swings, or difficulty sleeping  HEME/LYMPH: No easy bruising, or bleeding gums  ALLERY AND IMMUNOLOGIC: No hives or eczema    Vital Signs Last 24 Hrs  T(C): 36.9 (2018 10:19), Max: 36.9 (2018 10:19)  T(F): 98.5 (2018 10:19), Max: 98.5 (2018 10:19)  HR: 78 (2018 10:19) (64 - 90)  BP: 114/81 (2018 10:19) (114/81 - 187/95)  BP(mean): --  RR: 16 (2018 10:19) (10 - 22)  SpO2: 100% (2018 10:19) (93% - 100%)        PHYSICAL EXAM:    GENERAL: NAD, well-groomed, well-developed  HEAD:  Atraumatic, Normocephalic  EYES: EOMI, PERRLA, conjunctiva and sclera clear  ENMT: No tonsillar erythema, exudates, or enlargement; Moist mucous membranes, Good dentition, No lesions  NECK: Supple, No JVD, Normal thyroid  NERVOUS SYSTEM:  Alert & Oriented X3, Good concentration; Motor Strength 5/5 B/L upper and lower extremities; DTRs 2+ intact and symmetric  CHEST/LUNG: Clear to percussion bilaterally; No rales, rhonchi, wheezing, or rubs  HEART: Regular rate and rhythm; No murmurs, rubs, or gallops  ABDOMEN: Soft, Nontender, Nondistended; Bowel sounds present  EXTREMITIES:  2+ Peripheral Pulses, No clubbing, cyanosis, or edema  LYMPH: No lymphadenopathy noted  SKIN: No rashes or lesions      LABS:                        9.4    11.3  )-----------( 242      ( 2018 07:58 )             28.6     02-08    138  |  99  |  20.0  ----------------------------<  104  5.1   |  26.0  |  0.96    Ca    9.6      2018 11:47    TPro  7.8  /  Alb  3.8  /  TBili  0.3<L>  /  DBili  x   /  AST  25  /  ALT  14  /  AlkPhos  75  02-08    PT/INR - ( 2018 11:47 )   PT: 10.9 sec;   INR: 0.99 ratio         PTT - ( 2018 11:47 )  PTT:31.1 sec  Urinalysis Basic - ( 2018 18:37 )    Color: Pale Yellow / Appearance: Clear / S.010 / pH: x  Gluc: x / Ketone: Negative  / Bili: Negative / Urobili: Negative mg/dL   Blood: x / Protein: Negative mg/dL / Nitrite: Negative   Leuk Esterase: Trace / RBC: 0-2 /HPF / WBC 26-50   Sq Epi: x / Non Sq Epi: Occasional / Bacteria: Few        RADIOLOGY & ADDITIONAL TESTS:

## 2018-02-09 NOTE — PHYSICAL THERAPY INITIAL EVALUATION ADULT - ADDITIONAL COMMENTS
Pt is poor historian.  Per chart/conversation with SW. Pt lives at the [facility] assisted living-dementia unit. Pt reports that she ambulated with walker and had assistance for ADLs

## 2018-02-10 LAB
-  AMIKACIN: SIGNIFICANT CHANGE UP
-  AMPICILLIN/SULBACTAM: SIGNIFICANT CHANGE UP
-  AMPICILLIN: SIGNIFICANT CHANGE UP
-  AZTREONAM: SIGNIFICANT CHANGE UP
-  CEFAZOLIN: SIGNIFICANT CHANGE UP
-  CEFEPIME: SIGNIFICANT CHANGE UP
-  CEFOXITIN: SIGNIFICANT CHANGE UP
-  CEFTAZIDIME: SIGNIFICANT CHANGE UP
-  CEFTRIAXONE: SIGNIFICANT CHANGE UP
-  CIPROFLOXACIN: SIGNIFICANT CHANGE UP
-  ERTAPENEM: SIGNIFICANT CHANGE UP
-  GENTAMICIN: SIGNIFICANT CHANGE UP
-  LEVOFLOXACIN: SIGNIFICANT CHANGE UP
-  MEROPENEM: SIGNIFICANT CHANGE UP
-  NITROFURANTOIN: SIGNIFICANT CHANGE UP
-  PIPERACILLIN/TAZOBACTAM: SIGNIFICANT CHANGE UP
-  TOBRAMYCIN: SIGNIFICANT CHANGE UP
-  TRIMETHOPRIM/SULFAMETHOXAZOLE: SIGNIFICANT CHANGE UP
ANION GAP SERPL CALC-SCNC: 13 MMOL/L — SIGNIFICANT CHANGE UP (ref 5–17)
BUN SERPL-MCNC: 25 MG/DL — HIGH (ref 8–20)
CALCIUM SERPL-MCNC: 8.7 MG/DL — SIGNIFICANT CHANGE UP (ref 8.6–10.2)
CHLORIDE SERPL-SCNC: 100 MMOL/L — SIGNIFICANT CHANGE UP (ref 98–107)
CO2 SERPL-SCNC: 25 MMOL/L — SIGNIFICANT CHANGE UP (ref 22–29)
CREAT SERPL-MCNC: 1.17 MG/DL — SIGNIFICANT CHANGE UP (ref 0.5–1.3)
CULTURE RESULTS: SIGNIFICANT CHANGE UP
GLUCOSE SERPL-MCNC: 96 MG/DL — SIGNIFICANT CHANGE UP (ref 70–115)
HCT VFR BLD CALC: 25.4 % — LOW (ref 37–47)
HGB BLD-MCNC: 8.3 G/DL — LOW (ref 12–16)
MCHC RBC-ENTMCNC: 29 PG — SIGNIFICANT CHANGE UP (ref 27–31)
MCHC RBC-ENTMCNC: 32.7 G/DL — SIGNIFICANT CHANGE UP (ref 32–36)
MCV RBC AUTO: 88.8 FL — SIGNIFICANT CHANGE UP (ref 81–99)
METHOD TYPE: SIGNIFICANT CHANGE UP
ORGANISM # SPEC MICROSCOPIC CNT: SIGNIFICANT CHANGE UP
ORGANISM # SPEC MICROSCOPIC CNT: SIGNIFICANT CHANGE UP
PLATELET # BLD AUTO: 246 K/UL — SIGNIFICANT CHANGE UP (ref 150–400)
POTASSIUM SERPL-MCNC: 4.5 MMOL/L — SIGNIFICANT CHANGE UP (ref 3.5–5.3)
POTASSIUM SERPL-SCNC: 4.5 MMOL/L — SIGNIFICANT CHANGE UP (ref 3.5–5.3)
RBC # BLD: 2.86 M/UL — LOW (ref 4.4–5.2)
RBC # FLD: 14.8 % — SIGNIFICANT CHANGE UP (ref 11–15.6)
SODIUM SERPL-SCNC: 138 MMOL/L — SIGNIFICANT CHANGE UP (ref 135–145)
SPECIMEN SOURCE: SIGNIFICANT CHANGE UP
WBC # BLD: 13 K/UL — HIGH (ref 4.8–10.8)
WBC # FLD AUTO: 13 K/UL — HIGH (ref 4.8–10.8)

## 2018-02-10 PROCEDURE — 71045 X-RAY EXAM CHEST 1 VIEW: CPT | Mod: 26

## 2018-02-10 PROCEDURE — 99233 SBSQ HOSP IP/OBS HIGH 50: CPT

## 2018-02-10 RX ORDER — FUROSEMIDE 40 MG
20 TABLET ORAL ONCE
Qty: 0 | Refills: 0 | Status: COMPLETED | OUTPATIENT
Start: 2018-02-10 | End: 2018-02-10

## 2018-02-10 RX ORDER — CIPROFLOXACIN LACTATE 400MG/40ML
250 VIAL (ML) INTRAVENOUS
Qty: 0 | Refills: 0 | Status: DISCONTINUED | OUTPATIENT
Start: 2018-02-10 | End: 2018-02-12

## 2018-02-10 RX ADMIN — Medication 650 MILLIGRAM(S): at 17:48

## 2018-02-10 RX ADMIN — Medication 1 TABLET(S): at 17:48

## 2018-02-10 RX ADMIN — PANTOPRAZOLE SODIUM 40 MILLIGRAM(S): 20 TABLET, DELAYED RELEASE ORAL at 05:25

## 2018-02-10 RX ADMIN — Medication 650 MILLIGRAM(S): at 02:03

## 2018-02-10 RX ADMIN — Medication 1 MILLIGRAM(S): at 21:52

## 2018-02-10 RX ADMIN — Medication 650 MILLIGRAM(S): at 11:45

## 2018-02-10 RX ADMIN — Medication 1 TABLET(S): at 05:24

## 2018-02-10 RX ADMIN — Medication 650 MILLIGRAM(S): at 18:30

## 2018-02-10 RX ADMIN — Medication 20 MILLIGRAM(S): at 12:32

## 2018-02-10 RX ADMIN — Medication 650 MILLIGRAM(S): at 23:54

## 2018-02-10 RX ADMIN — ENOXAPARIN SODIUM 40 MILLIGRAM(S): 100 INJECTION SUBCUTANEOUS at 11:44

## 2018-02-10 RX ADMIN — Medication 650 MILLIGRAM(S): at 05:24

## 2018-02-10 RX ADMIN — Medication 650 MILLIGRAM(S): at 12:45

## 2018-02-10 RX ADMIN — Medication 500 MILLIGRAM(S): at 11:44

## 2018-02-10 RX ADMIN — Medication 650 MILLIGRAM(S): at 06:20

## 2018-02-10 RX ADMIN — Medication 250 MILLIGRAM(S): at 11:45

## 2018-02-10 RX ADMIN — SENNA PLUS 2 TABLET(S): 8.6 TABLET ORAL at 21:52

## 2018-02-10 NOTE — PROGRESS NOTE ADULT - SUBJECTIVE AND OBJECTIVE BOX
pt seen and examined s/p right hip percutaneous pinning, POD # 2 states pain controlled. denies CP,SOB, dizziness no significant complaints    Vital Signs Last 24 Hrs  T(C): 36.7 (10 Feb 2018 12:04), Max: 37.1 (09 Feb 2018 17:49)  T(F): 98 (10 Feb 2018 12:04), Max: 98.8 (09 Feb 2018 17:49)  HR: 72 (10 Feb 2018 12:04) (72 - 92)  BP: 118/53 (10 Feb 2018 12:04) (118/53 - 175/82)  BP(mean): --  RR: 18 (10 Feb 2018 12:04) (16 - 18)  SpO2: 96% (10 Feb 2018 12:04) (96% - 100%)                          8.3    13.0  )-----------( 246      ( 10 Feb 2018 05:36 )             25.4     PE: NAD, responds to ques and commands although confused secondary to dementia  RLE:  dressing changed, incision C/D/I, no erythema, no significant swelling  gross motor intact: + dorsi/plantar, EHL,FHL  gross sensation intact to light touch  pulses appreciated  calves soft,NT b/l LE    A/P 87 y/o female POD # 2 right hip perc pinning    pain control  DVT PPX:  Lovenox 40qd, SCD's  PT: WBAT  ortho stable  continue care with primary team

## 2018-02-10 NOTE — PROGRESS NOTE ADULT - ASSESSMENT
Ms Canas, she is a 86 year old female with past medical H/O  Alzheimer's Dementia,  macular degeneration, currently a resident of assisted living facility " [facility] " and arthritis presents after a fall. Pt is unable to provide any History and most of the history obtained from pt's 2 daughters who are currently at bed side. Reportedly, pt was found on the floor at assisted Living and then they call 911 who brought her to hospital. In ER, work up showed Rt Femur neck fracture for which Orthopedics consulted, who is planning to take her to OR later [facility] for [facility]    Acute Right sided Femur neck fracture: S/P Mechanical fall  consulted ortho:   s/p right hip percutaneous pinning POD#2  pain control  PT/OT  needs ARNULFO    UTI : present on admission  Urine Cx: Proteus Mirabilis pansensitive  continue ciprofloxacin 250 mg Q 12    Coarse Breath sounds: suspect from Volume Over load from too much fluids she is getting  stop IVF  give One time dose of IV LASIX 20 MG   Get X ray chest Portable  Last ECHO, SEP 2017:  EF 60--65 %    Dementia with Behavioural disturbance maintain fall precautions   Zyprexa PRN for agitation     DVT Prophylaxis LOVENOX QD,     Disposition : Needs ARNULFO placement. spoke with social work and case management     Both patient's daughters Teetee Elizalde ( [phone] ) and Inna Canas ( [phone] ) are very involved in her care

## 2018-02-10 NOTE — PROGRESS NOTE ADULT - SUBJECTIVE AND OBJECTIVE BOX
MARITA LY  ----------------------------------------    CC:  Follow up visit for Rt Femur neck fracture    pleasantly confused  No family at bed side  post op hemoglobin stable  coarse breath sounds this am, stopped fluids and gave a dose of Lasix  Urine cx : Proteus, started Cipro    Vital Signs Last 24 Hrs    T(C): 36.7 (10 Feb 2018 08:52), Max: 37.1 (2018 17:49)  T(F): 98.1 (10 Feb 2018 08:52), Max: 98.8 (2018 17:49)  HR: 80 (10 Feb 2018 08:52) (76 - 92)  BP: 138/63 (10 Feb 2018 08:52) (128/66 - 175/82)  BP(mean): --  RR: 18 (10 Feb 2018 08:52) (16 - 18)  SpO2: 99% (10 Feb 2018 08:52) (99% - 100%)    CAPILLARY BLOOD GLUCOSE        PHYSICAL EXAMINATION:  ----------------------------------------    General appearance: NAD, Awake, Alert  HEENT: NCAT, Conjunctiva clear, EOMI  Neck: Supple, No JVD  Lungs: Clear to auscultation, Breath sound equal bilaterally  Cardiovascular: S1S2, Regular rhythm  Abdomen: Soft, Nontender, Positive bowel sounds  Extremities: No clubbing, No cyanosis, No edema  + Rt Hip surgery site covered with bandage  CNS: confused       LABORATORY STUDIES:  ----------------------------------------                        8.3    13.0  )-----------( 246      ( 10 Feb 2018 05:36 )             25.4     02-10    138  |  100  |  25.0<H>  ----------------------------<  96  4.5   |  25.0  |  1.17    Ca    8.7      10 Feb 2018 05:36    Urinalysis Basic - ( 2018 18:37 )    Color: Pale Yellow / Appearance: Clear / S.010 / pH: x  Gluc: x / Ketone: Negative  / Bili: Negative / Urobili: Negative mg/dL   Blood: x / Protein: Negative mg/dL / Nitrite: Negative   Leuk Esterase: Trace / RBC: 0-2 /HPF / WBC 26-50   Sq Epi: x / Non Sq Epi: Occasional / Bacteria: Few      Culture - Urine (collected 2018 18:39)  Source: .Urine Clean Catch (Midstream)  Final Report (10 Feb 2018 09:40):    >100,000 CFU/ml Proteus mirabilis  Organism: Proteus mirabilis (10 Feb 2018 09:40)  Organism: Proteus mirabilis (10 Feb 2018 09:40)      MEDICATIONS  (STANDING):  acetaminophen   Tablet. 650 milliGRAM(s) Oral every 6 hours  ascorbic acid 500 milliGRAM(s) Oral daily  calcium carbonate  625 mG + Vitamin D (OsCal 250 + D) 1 Tablet(s) Oral every 12 hours  ciprofloxacin     Tablet 250 milliGRAM(s) Oral two times a day  enoxaparin Injectable 40 milliGRAM(s) SubCutaneous daily  furosemide   Injectable 20 milliGRAM(s) IV Push once  labetalol Injectable 10 milliGRAM(s) IV Push once  melatonin 1 milliGRAM(s) Oral at bedtime  pantoprazole    Tablet 40 milliGRAM(s) Oral before breakfast  senna 2 Tablet(s) Oral at bedtime    MEDICATIONS  (PRN):  docusate sodium 100 milliGRAM(s) Oral two times a day PRN Constipation  hydrALAZINE Injectable 10 milliGRAM(s) IV Push every 6 hours PRN SBP > 160  oxyCODONE    IR 10 milliGRAM(s) Oral every 4 hours PRN Moderate Pain (4 - 6)  oxyCODONE    IR 5 milliGRAM(s) Oral every 3 hours PRN Mild Pain (1 - 3)      ASSESSMENT / PLAN:  ----------------------------------------

## 2018-02-11 ENCOUNTER — TRANSCRIPTION ENCOUNTER (OUTPATIENT)
Age: 83
End: 2018-02-11

## 2018-02-11 LAB
ANION GAP SERPL CALC-SCNC: 9 MMOL/L — SIGNIFICANT CHANGE UP (ref 5–17)
BUN SERPL-MCNC: 24 MG/DL — HIGH (ref 8–20)
CALCIUM SERPL-MCNC: 8.5 MG/DL — LOW (ref 8.6–10.2)
CHLORIDE SERPL-SCNC: 100 MMOL/L — SIGNIFICANT CHANGE UP (ref 98–107)
CO2 SERPL-SCNC: 27 MMOL/L — SIGNIFICANT CHANGE UP (ref 22–29)
CREAT SERPL-MCNC: 1 MG/DL — SIGNIFICANT CHANGE UP (ref 0.5–1.3)
GLUCOSE SERPL-MCNC: 95 MG/DL — SIGNIFICANT CHANGE UP (ref 70–115)
HCT VFR BLD CALC: 23.9 % — LOW (ref 37–47)
HGB BLD-MCNC: 7.9 G/DL — LOW (ref 12–16)
MCHC RBC-ENTMCNC: 29.2 PG — SIGNIFICANT CHANGE UP (ref 27–31)
MCHC RBC-ENTMCNC: 33.1 G/DL — SIGNIFICANT CHANGE UP (ref 32–36)
MCV RBC AUTO: 88.2 FL — SIGNIFICANT CHANGE UP (ref 81–99)
PLATELET # BLD AUTO: 235 K/UL — SIGNIFICANT CHANGE UP (ref 150–400)
POTASSIUM SERPL-MCNC: 4.1 MMOL/L — SIGNIFICANT CHANGE UP (ref 3.5–5.3)
POTASSIUM SERPL-SCNC: 4.1 MMOL/L — SIGNIFICANT CHANGE UP (ref 3.5–5.3)
RBC # BLD: 2.71 M/UL — LOW (ref 4.4–5.2)
RBC # FLD: 15 % — SIGNIFICANT CHANGE UP (ref 11–15.6)
SODIUM SERPL-SCNC: 136 MMOL/L — SIGNIFICANT CHANGE UP (ref 135–145)
WBC # BLD: 10.4 K/UL — SIGNIFICANT CHANGE UP (ref 4.8–10.8)
WBC # FLD AUTO: 10.4 K/UL — SIGNIFICANT CHANGE UP (ref 4.8–10.8)

## 2018-02-11 PROCEDURE — 99232 SBSQ HOSP IP/OBS MODERATE 35: CPT

## 2018-02-11 RX ORDER — FUROSEMIDE 40 MG
20 TABLET ORAL ONCE
Qty: 0 | Refills: 0 | Status: COMPLETED | OUTPATIENT
Start: 2018-02-11 | End: 2018-02-11

## 2018-02-11 RX ORDER — OXYCODONE HYDROCHLORIDE 5 MG/1
1 TABLET ORAL
Qty: 0 | Refills: 0 | COMMUNITY
Start: 2018-02-11

## 2018-02-11 RX ORDER — CIPROFLOXACIN LACTATE 400MG/40ML
1 VIAL (ML) INTRAVENOUS
Qty: 10 | Refills: 0 | OUTPATIENT
Start: 2018-02-11 | End: 2018-02-15

## 2018-02-11 RX ORDER — SENNA PLUS 8.6 MG/1
2 TABLET ORAL
Qty: 0 | Refills: 0 | COMMUNITY
Start: 2018-02-11

## 2018-02-11 RX ORDER — RIVAROXABAN 15 MG-20MG
1 KIT ORAL
Qty: 14 | Refills: 0 | OUTPATIENT
Start: 2018-02-11 | End: 2018-02-24

## 2018-02-11 RX ADMIN — Medication 1 TABLET(S): at 17:14

## 2018-02-11 RX ADMIN — Medication 500 MILLIGRAM(S): at 11:14

## 2018-02-11 RX ADMIN — OXYCODONE HYDROCHLORIDE 5 MILLIGRAM(S): 5 TABLET ORAL at 18:39

## 2018-02-11 RX ADMIN — OXYCODONE HYDROCHLORIDE 5 MILLIGRAM(S): 5 TABLET ORAL at 19:00

## 2018-02-11 RX ADMIN — Medication 650 MILLIGRAM(S): at 11:14

## 2018-02-11 RX ADMIN — OXYCODONE HYDROCHLORIDE 5 MILLIGRAM(S): 5 TABLET ORAL at 06:19

## 2018-02-11 RX ADMIN — SENNA PLUS 2 TABLET(S): 8.6 TABLET ORAL at 21:40

## 2018-02-11 RX ADMIN — Medication 1 TABLET(S): at 05:19

## 2018-02-11 RX ADMIN — Medication 250 MILLIGRAM(S): at 05:19

## 2018-02-11 RX ADMIN — ENOXAPARIN SODIUM 40 MILLIGRAM(S): 100 INJECTION SUBCUTANEOUS at 11:14

## 2018-02-11 RX ADMIN — Medication 650 MILLIGRAM(S): at 12:28

## 2018-02-11 RX ADMIN — OXYCODONE HYDROCHLORIDE 5 MILLIGRAM(S): 5 TABLET ORAL at 05:19

## 2018-02-11 RX ADMIN — Medication 650 MILLIGRAM(S): at 05:22

## 2018-02-11 RX ADMIN — PANTOPRAZOLE SODIUM 40 MILLIGRAM(S): 20 TABLET, DELAYED RELEASE ORAL at 05:19

## 2018-02-11 RX ADMIN — Medication 1 MILLIGRAM(S): at 21:40

## 2018-02-11 RX ADMIN — Medication 250 MILLIGRAM(S): at 17:14

## 2018-02-11 RX ADMIN — Medication 650 MILLIGRAM(S): at 05:19

## 2018-02-11 RX ADMIN — Medication 650 MILLIGRAM(S): at 17:14

## 2018-02-11 RX ADMIN — Medication 650 MILLIGRAM(S): at 18:14

## 2018-02-11 RX ADMIN — Medication 20 MILLIGRAM(S): at 16:48

## 2018-02-11 RX ADMIN — Medication 650 MILLIGRAM(S): at 01:01

## 2018-02-11 NOTE — DISCHARGE NOTE ADULT - PROVIDER TOKENS
TOKEN:'[id number]',FREE:[LAST:[PCP],PHONE:[(   )    -],FAX:[(   )    -],ADDRESS:[Primary MD at [facility] in 1 week]]

## 2018-02-11 NOTE — DISCHARGE NOTE ADULT - CARE PROVIDER_API CALL
Maxi Fagan (MD), Orthopaedic Surgery  [address] [place] [address]  Phone: [phone]  Fax: [phone]    PCP,   Primary MD at [facility] in 1 week  Phone: (   )    -  Fax: (   )    -

## 2018-02-11 NOTE — DISCHARGE NOTE ADULT - SECONDARY DIAGNOSIS.
Late onset Alzheimer's disease without behavioral disturbance Macular degeneration UTI (urinary tract infection)

## 2018-02-11 NOTE — DISCHARGE NOTE ADULT - CARE PLAN
Principal Discharge DX:	Closed fracture of neck of right femur, initial encounter  Goal:	AMBULATION, PT/OT and good wound healing  Assessment and plan of treatment:	You may shower on 2/11/2018.  Dressing may be removed on 2/18/2018.  Follow up with Dr. Fagan in 2 weeks.  Secondary Diagnosis:	Late onset Alzheimer's disease without behavioral disturbance  Secondary Diagnosis:	Macular degeneration  Secondary Diagnosis:	UTI (urinary tract infection)  Goal:	complete 5 more days of antibiotic ciprofloxacin  Assessment and plan of treatment:	Repeat UA in 1 week, with Primary MD to make sure UTI has resolved completely

## 2018-02-11 NOTE — DISCHARGE NOTE ADULT - ADDITIONAL INSTRUCTIONS
Ortho recs:  s/p Right hip percutaneous pinning 2/8/2018 with Dr. Fagan.  WBAT with PT.  Keep dressing clean and dry.  You may shower on 2/11/2018.  Dressing may be removed on 2/18/2018.  Follow up with Dr. Fagan in 2 weeks.  If incision becomes red, swollen, discharge occurs or fever develops notify office immediately

## 2018-02-11 NOTE — PROGRESS NOTE ADULT - SUBJECTIVE AND OBJECTIVE BOX
s/p right hip perc pinning pod #3.  Pt c/o discomfort to right LE and ribs.  no other c/o    Vital Signs Last 24 Hrs  T(C): 37.5 (10 Feb 2018 23:43), Max: 37.5 (10 Feb 2018 23:43)  T(F): 99.5 (10 Feb 2018 23:43), Max: 99.5 (10 Feb 2018 23:43)  HR: 82 (10 Feb 2018 23:43) (72 - 84)  BP: 116/68 (10 Feb 2018 23:43) (116/68 - 141/58)  BP(mean): --  RR: 16 (10 Feb 2018 23:43) (16 - 18)  SpO2: 93% (10 Feb 2018 23:43) (93% - 96%)    Pt lying in bed NAD  right hip dressing c/d/i  calf soft NT  +dorsiflexion  sensation intact  pulses palp    a/p:  s/p right hip perc pinning pod #3  WBAT  PT  DVTP  pain control   ortho stable for d/c

## 2018-02-11 NOTE — PROGRESS NOTE ADULT - ASSESSMENT
Ms Canas, she is a 86 year old female with past medical H/O  Alzheimer's Dementia,  macular degeneration, currently a resident of assisted living facility " [facility] " and arthritis presents after a fall. Pt is unable to provide any History and most of the history obtained from pt's 2 daughters who are currently at bed side. Reportedly, pt was found on the floor at assisted Living and then they call 911 who brought her to hospital. In ER, work up showed Rt Femur neck fracture for which Orthopedics consulted, who is [facility]    Acute Right sided Femur neck fracture: S/P Mechanical fall  consulted ortho:   s/p right hip percutaneous pinning POD#3  pain control  PT/OT  needs ARNULFO    UTI : present on admission  Urine Cx: Proteus Mirabilis pansensitive  continue ciprofloxacin 250 mg Q 12, Day 2 /3    Coarse Breath sounds: suspect from Volume Over load from too much fluids she is getting  stop IVF  give One time dose of IV LASIX 20 MG   Get X ray chest Portable  Last ECHO, SEP 2017:  EF 60--65 %    Dementia with Behavioural disturbance maintain fall precautions   Zyprexa PRN for agitation     DVT Prophylaxis LOVENOX QD,     Code status : discussed, and confirmed DNR status    Disposition : Needs ARNULFO placement. spoke with social work and case management. Family prefers [facility] [place]      Both patient's daughters Teetee Elizalde ( [phone] ) and Inna Canas ( [phone] ) are very involved in her care. spoke with daughter at bed side

## 2018-02-11 NOTE — DISCHARGE NOTE ADULT - HOSPITAL COURSE
Ms Canas, she is a 86 year old female with past medical H/O  Alzheimer's Dementia,  macular degeneration, currently a resident of assisted living facility " [facility] " and arthritis presents after a fall.. Reportedly, pt was found on the floor at assisted Living and then they call 911 who brought her to hospital. In ER, work up showed Rt Femur neck fracture for which Orthopedics consulted, who is planning to [facility]    Acute Right sided Femur neck fracture: S/P Mechanical fall  consulted ortho:   s/p right hip percutaneous pinning POD#3  pain control with Oxycodone PRN  PT/OT  ARNULFO at discharge  You may shower on 2/11/2018.  Dressing may be removed on 2/18/2018.  Follow up with Dr. Fagan in 2 weeks.    UTI : present on admission  Urine Cx: Proteus Mirabilis pansensitive  continue ciprofloxacin 250 mg Q 12, Day 2 /3  Need 5 more days to complete 7 day course    Coarse Breath sounds: suspect from Volume Over load from too much fluids she is getting  Last ECHO, SEP 2017:  EF 60--65 %    Dementia with Behavioural disturbance maintain fall precautions      DVT Prophylaxis XARELTO 10 MG QD for 2 weeks, and then  MG QD    Code status : discussed, and confirmed DNR status    Disposition : ARNULFO placement. spoke with social work and case management. Family prefers [facility] [place]      Both patient's daughters Teetee Elizalde ( [phone] ) and Inna Canas ( [phone] ) are very involved in her care.     Total time spent in discharge 45 minutes, more than 50 % time spent in counselling and co-ordination of patient's care

## 2018-02-11 NOTE — DISCHARGE NOTE ADULT - PATIENT PORTAL LINK FT
You can access the [facility], offered by [facility], by registering with the following website: [email or web address]

## 2018-02-11 NOTE — PROGRESS NOTE ADULT - SUBJECTIVE AND OBJECTIVE BOX
MARITA LY  ----------------------------------------    CC:  Follow up visit for Rt Femur neck fracture    pleasantly confused  Daughter at bed side, answered all question      Vital Signs Last 24 Hrs    T(C): 36.9 (11 Feb 2018 11:37), Max: 37.5 (10 Feb 2018 23:43)  T(F): 98.5 (11 Feb 2018 11:37), Max: 99.5 (10 Feb 2018 23:43)  HR: 75 (11 Feb 2018 11:37) (75 - 84)  BP: 120/62 (11 Feb 2018 11:37) (116/68 - 141/58)  BP(mean): --  RR: 16 (11 Feb 2018 11:37) (16 - 18)  SpO2: 98% (11 Feb 2018 11:37) (93% - 98%)    CAPILLARY BLOOD GLUCOSE        PHYSICAL EXAMINATION:  ----------------------------------------    General appearance: NAD, Awake, Alert  HEENT: NCAT, Conjunctiva clear, EOMI  Neck: Supple, No JVD  Lungs: Clear to auscultation, Breath sound equal bilaterally  Cardiovascular: S1S2, Regular rhythm  Abdomen: Soft, Nontender, Positive bowel sounds  Extremities: No clubbing, No cyanosis, No edema  + Rt Hip surgery site covered with bandage  CNS: confused     LABORATORY STUDIES:  ----------------------------------------                        7.9    10.4  )-----------( 235      ( 11 Feb 2018 07:01 )             23.9     02-11    136  |  100  |  24.0<H>  ----------------------------<  95  4.1   |  27.0  |  1.00    Ca    8.5<L>      11 Feb 2018 07:01      Culture - Urine (collected 08 Feb 2018 18:39)  Source: .Urine Clean Catch (Midstream)  Final Report (10 Feb 2018 09:40):    >100,000 CFU/ml Proteus mirabilis  Organism: Proteus mirabilis (10 Feb 2018 09:40)  Organism: Proteus mirabilis (10 Feb 2018 09:40)      MEDICATIONS  (STANDING):    acetaminophen   Tablet. 650 milliGRAM(s) Oral every 6 hours  ascorbic acid 500 milliGRAM(s) Oral daily  calcium carbonate  625 mG + Vitamin D (OsCal 250 + D) 1 Tablet(s) Oral every 12 hours  ciprofloxacin     Tablet 250 milliGRAM(s) Oral two times a day  enoxaparin Injectable 40 milliGRAM(s) SubCutaneous daily  furosemide   Injectable 20 milliGRAM(s) IV Push once  labetalol Injectable 10 milliGRAM(s) IV Push once  melatonin 1 milliGRAM(s) Oral at bedtime  pantoprazole    Tablet 40 milliGRAM(s) Oral before breakfast  senna 2 Tablet(s) Oral at bedtime    MEDICATIONS  (PRN):  docusate sodium 100 milliGRAM(s) Oral two times a day PRN Constipation  hydrALAZINE Injectable 10 milliGRAM(s) IV Push every 6 hours PRN SBP > 160  oxyCODONE    IR 10 milliGRAM(s) Oral every 4 hours PRN Moderate Pain (4 - 6)  oxyCODONE    IR 5 milliGRAM(s) Oral every 3 hours PRN Mild Pain (1 - 3)      ASSESSMENT / PLAN:  ----------------------------------------

## 2018-02-11 NOTE — DISCHARGE NOTE ADULT - PLAN OF CARE
AMBULATION, PT/OT and good wound healing You may shower on 2/11/2018.  Dressing may be removed on 2/18/2018.  Follow up with Dr. Fagan in 2 weeks. complete 5 more days of antibiotic ciprofloxacin Repeat UA in 1 week, with Primary MD to make sure UTI has resolved completely

## 2018-02-11 NOTE — DISCHARGE NOTE ADULT - MEDICATION SUMMARY - MEDICATIONS TO TAKE
I will START or STAY ON the medications listed below when I get home from the hospital:    acetaminophen 325 mg oral tablet  -- 2 tab(s) by mouth every 6 hours, As needed, Mild Pain (1 - 3)  -- Indication: For pain    acetaminophen 325 mg oral tablet  -- 2 tab(s) by mouth once a day (at bedtime)  -- Indication: For pain    oxyCODONE 5 mg oral tablet  -- 1 tab(s) by mouth every 3 hours, As needed, Mild Pain (1 - 3)  -- Indication: For mild post op pain    oxyCODONE 10 mg oral tablet  -- 1 tab(s) by mouth every 4 hours, As needed, Moderate Pain (4 - 6)  -- Indication: For moderate post op pain    Xarelto 10 mg oral tablet  -- 1 tab(s) by mouth once a day for 14 days and then stop  -- Check with your doctor before becoming pregnant.  It is very important that you take or use this exactly as directed.  Do not skip doses or discontinue unless directed by your doctor.  Obtain medical advice before taking any non-prescription drugs as some may affect the action of this medication.  Take with food.    -- Indication: For Post op DVT Prophylais    nystatin 100,000 units/g topical powder  -- 1 application on skin 2 times a day  -- Indication: For Candida    senna oral tablet  -- 2 tab(s) by mouth once a day (at bedtime)  -- Indication: For Constipation prevention    docusate sodium 100 mg oral capsule  -- 1 cap(s) by mouth 2 times a day, As needed, Constipation  -- Indication: For Constipation prevention    melatonin 3 mg oral tablet  -- 1 tab(s) by mouth once a day (at bedtime)  -- Indication: For sleep    pantoprazole 40 mg oral delayed release tablet  -- 1 tab(s) by mouth once a day (before a meal)  -- Indication: For gerd    ciprofloxacin 250 mg oral tablet  -- 1 tab(s) by mouth 2 times a day  -- Indication: For uti    calcium (as carbonate)-vitamin D 250 mg-125 intl units oral tablet  -- 1 tab(s) by mouth 2 times a day  -- Indication: For vit d deficiency    ascorbic acid 500 mg oral tablet  -- 1 tab(s) by mouth once a day  -- Indication: For supplement

## 2018-02-12 VITALS
TEMPERATURE: 98 F | HEART RATE: 84 BPM | RESPIRATION RATE: 18 BRPM | DIASTOLIC BLOOD PRESSURE: 65 MMHG | SYSTOLIC BLOOD PRESSURE: 135 MMHG | OXYGEN SATURATION: 94 %

## 2018-02-12 LAB
ALBUMIN SERPL ELPH-MCNC: 3 G/DL — LOW (ref 3.3–5.2)
ALP SERPL-CCNC: 58 U/L — SIGNIFICANT CHANGE UP (ref 40–120)
ALT FLD-CCNC: 6 U/L — SIGNIFICANT CHANGE UP
ANION GAP SERPL CALC-SCNC: 11 MMOL/L — SIGNIFICANT CHANGE UP (ref 5–17)
AST SERPL-CCNC: 20 U/L — SIGNIFICANT CHANGE UP
BILIRUB SERPL-MCNC: 0.3 MG/DL — LOW (ref 0.4–2)
BUN SERPL-MCNC: 21 MG/DL — HIGH (ref 8–20)
CALCIUM SERPL-MCNC: 8.5 MG/DL — LOW (ref 8.6–10.2)
CHLORIDE SERPL-SCNC: 96 MMOL/L — LOW (ref 98–107)
CO2 SERPL-SCNC: 26 MMOL/L — SIGNIFICANT CHANGE UP (ref 22–29)
CREAT SERPL-MCNC: 1.1 MG/DL — SIGNIFICANT CHANGE UP (ref 0.5–1.3)
GLUCOSE SERPL-MCNC: 103 MG/DL — SIGNIFICANT CHANGE UP (ref 70–115)
HCT VFR BLD CALC: 23.2 % — LOW (ref 37–47)
HGB BLD-MCNC: 7.6 G/DL — LOW (ref 12–16)
MCHC RBC-ENTMCNC: 29.5 PG — SIGNIFICANT CHANGE UP (ref 27–31)
MCHC RBC-ENTMCNC: 32.8 G/DL — SIGNIFICANT CHANGE UP (ref 32–36)
MCV RBC AUTO: 89.9 FL — SIGNIFICANT CHANGE UP (ref 81–99)
PLATELET # BLD AUTO: 248 K/UL — SIGNIFICANT CHANGE UP (ref 150–400)
POTASSIUM SERPL-MCNC: 4.2 MMOL/L — SIGNIFICANT CHANGE UP (ref 3.5–5.3)
POTASSIUM SERPL-SCNC: 4.2 MMOL/L — SIGNIFICANT CHANGE UP (ref 3.5–5.3)
PROT SERPL-MCNC: 6 G/DL — LOW (ref 6.6–8.7)
RBC # BLD: 2.58 M/UL — LOW (ref 4.4–5.2)
RBC # FLD: 14.9 % — SIGNIFICANT CHANGE UP (ref 11–15.6)
SODIUM SERPL-SCNC: 133 MMOL/L — LOW (ref 135–145)
WBC # BLD: 8.5 K/UL — SIGNIFICANT CHANGE UP (ref 4.8–10.8)
WBC # FLD AUTO: 8.5 K/UL — SIGNIFICANT CHANGE UP (ref 4.8–10.8)

## 2018-02-12 PROCEDURE — 97116 GAIT TRAINING THERAPY: CPT

## 2018-02-12 PROCEDURE — 97110 THERAPEUTIC EXERCISES: CPT

## 2018-02-12 PROCEDURE — 99285 EMERGENCY DEPT VISIT HI MDM: CPT | Mod: 25

## 2018-02-12 PROCEDURE — 80053 COMPREHEN METABOLIC PANEL: CPT

## 2018-02-12 PROCEDURE — C1713: CPT

## 2018-02-12 PROCEDURE — 87186 SC STD MICRODIL/AGAR DIL: CPT

## 2018-02-12 PROCEDURE — 76000 FLUOROSCOPY <1 HR PHYS/QHP: CPT

## 2018-02-12 PROCEDURE — 87086 URINE CULTURE/COLONY COUNT: CPT

## 2018-02-12 PROCEDURE — 71045 X-RAY EXAM CHEST 1 VIEW: CPT

## 2018-02-12 PROCEDURE — 86901 BLOOD TYPING SEROLOGIC RH(D): CPT

## 2018-02-12 PROCEDURE — 85730 THROMBOPLASTIN TIME PARTIAL: CPT

## 2018-02-12 PROCEDURE — 73502 X-RAY EXAM HIP UNI 2-3 VIEWS: CPT

## 2018-02-12 PROCEDURE — 85610 PROTHROMBIN TIME: CPT

## 2018-02-12 PROCEDURE — 86923 COMPATIBILITY TEST ELECTRIC: CPT

## 2018-02-12 PROCEDURE — 80048 BASIC METABOLIC PNL TOTAL CA: CPT

## 2018-02-12 PROCEDURE — 96374 THER/PROPH/DIAG INJ IV PUSH: CPT

## 2018-02-12 PROCEDURE — 76377 3D RENDER W/INTRP POSTPROCES: CPT

## 2018-02-12 PROCEDURE — 72192 CT PELVIS W/O DYE: CPT

## 2018-02-12 PROCEDURE — 73501 X-RAY EXAM HIP UNI 1 VIEW: CPT

## 2018-02-12 PROCEDURE — 93005 ELECTROCARDIOGRAM TRACING: CPT

## 2018-02-12 PROCEDURE — 81001 URINALYSIS AUTO W/SCOPE: CPT

## 2018-02-12 PROCEDURE — 99239 HOSP IP/OBS DSCHRG MGMT >30: CPT

## 2018-02-12 PROCEDURE — 85027 COMPLETE CBC AUTOMATED: CPT

## 2018-02-12 PROCEDURE — 86850 RBC ANTIBODY SCREEN: CPT

## 2018-02-12 PROCEDURE — 36415 COLL VENOUS BLD VENIPUNCTURE: CPT

## 2018-02-12 PROCEDURE — C1889: CPT

## 2018-02-12 PROCEDURE — 86900 BLOOD TYPING SEROLOGIC ABO: CPT

## 2018-02-12 RX ADMIN — OXYCODONE HYDROCHLORIDE 5 MILLIGRAM(S): 5 TABLET ORAL at 02:30

## 2018-02-12 RX ADMIN — Medication 250 MILLIGRAM(S): at 05:21

## 2018-02-12 RX ADMIN — Medication 650 MILLIGRAM(S): at 06:21

## 2018-02-12 RX ADMIN — Medication 1 TABLET(S): at 05:20

## 2018-02-12 RX ADMIN — PANTOPRAZOLE SODIUM 40 MILLIGRAM(S): 20 TABLET, DELAYED RELEASE ORAL at 05:22

## 2018-02-12 RX ADMIN — Medication 650 MILLIGRAM(S): at 05:21

## 2018-02-12 RX ADMIN — OXYCODONE HYDROCHLORIDE 5 MILLIGRAM(S): 5 TABLET ORAL at 03:30

## 2018-02-12 NOTE — PROGRESS NOTE ADULT - SUBJECTIVE AND OBJECTIVE BOX
MARITA LY    [id number]    History: Patient is status post right  hip percutaneous pinning.   . Patient is doing well and is comfortable. The patient's pain is controlled using the prescribed pain medications. The patient is participating in physical therapy, WBAT. Denies nausea, vomiting, chest pain, shortness of breath, abdominal pain or fever. No new complaints.                              7.9    10.4  )-----------( 235      ( 11 Feb 2018 07:01 )             23.9     02-11    136  |  100  |  24.0<H>  ----------------------------<  95  4.1   |  27.0  |  1.00    Ca    8.5<L>      11 Feb 2018 07:01        MEDICATIONS  (STANDING):  acetaminophen   Tablet. 650 milliGRAM(s) Oral every 6 hours  ascorbic acid 500 milliGRAM(s) Oral daily  calcium carbonate  625 mG + Vitamin D (OsCal 250 + D) 1 Tablet(s) Oral every 12 hours  ciprofloxacin     Tablet 250 milliGRAM(s) Oral two times a day  enoxaparin Injectable 40 milliGRAM(s) SubCutaneous daily  labetalol Injectable 10 milliGRAM(s) IV Push once  melatonin 1 milliGRAM(s) Oral at bedtime  pantoprazole    Tablet 40 milliGRAM(s) Oral before breakfast  senna 2 Tablet(s) Oral at bedtime    MEDICATIONS  (PRN):  docusate sodium 100 milliGRAM(s) Oral two times a day PRN Constipation  hydrALAZINE Injectable 10 milliGRAM(s) IV Push every 6 hours PRN SBP > 160  oxyCODONE    IR 10 milliGRAM(s) Oral every 4 hours PRN Moderate Pain (4 - 6)  oxyCODONE    IR 5 milliGRAM(s) Oral every 3 hours PRN Mild Pain (1 - 3)      Physical exam: The right hip dressing is clean, dry and intact. No drainage or discharge. No erythema is noted. No blistering. No ecchymosis. The calf is supple nontender. Passive range of motion is acceptable to due postoperative pain. No calf tenderness. Sensation to light touch is grossly intact distally. The lateral cutaneous nerve is intact. Motor function distally is 5/5. No foot drop. 2+ dorsalis pedis pulse. Capillary refill is less than 2 seconds. No cyanosis.    Primary Orthopedic Assessment:  • s/p RIGHT hip percutaneous pinning    Secondary  Orthopedic Assessment(s):   •     Secondary  Medical Assessment(s):   •     Plan:   • DVT prophylaxis as prescribed, including use of compression devices and ankle pumps  • Continue physical therapy  • Weightbearing as tolerated of the right lower extremity with assistance of a walker  • Incentive spirometry encouraged  • Pain control as clinically indicated  •  Discharge planning – anticipated discharge is  subacute rehabilitation

## 2018-02-12 NOTE — ED PROVIDER NOTE - CARE PLAN
79 F from home, lives with friend PMH of MS, HTN, HLD , MS, CVA with left hemiparesis , bed bound at baseline was brought in by friend for respiratory distress. As per friend, she (herself) had a flu recently and she noticed pt doesn't look right on friday night , called the PCP , recommended to gave cold medication. Pt did not c/o of any pain or SOB but noticed coughing. This morning she was found o be making gurgling noises and secretion/mucus coming out of her mouth. EMS arrived found her to be in respiratory distress with low sPO2 and accessory muscle use , endotracheal intubation was attempted twice with 40 mg of etomidate but failed , oral airway was inserted and ventilation via BMV were continued.   Patient getting admitted to ICU for acute respiratory failure requiring mechanical ventilation , awaiting RVP and head CT. Principal Discharge DX:	Closed fracture of neck of right femur, initial encounter

## 2018-02-22 ENCOUNTER — APPOINTMENT (OUTPATIENT)
Dept: ORTHOPEDIC SURGERY | Facility: CLINIC | Age: 83
End: 2018-02-22
Payer: MEDICARE

## 2018-03-02 ENCOUNTER — APPOINTMENT (OUTPATIENT)
Dept: ORTHOPEDIC SURGERY | Facility: CLINIC | Age: 83
End: 2018-03-02
Payer: MEDICARE

## 2018-03-02 DIAGNOSIS — Z87.81 OTHER SPECIFIED POSTPROCEDURAL STATES: ICD-10-CM

## 2018-03-02 DIAGNOSIS — Z98.890 OTHER SPECIFIED POSTPROCEDURAL STATES: ICD-10-CM

## 2018-03-02 PROCEDURE — 73502 X-RAY EXAM HIP UNI 2-3 VIEWS: CPT | Mod: RT

## 2018-03-02 PROCEDURE — 99024 POSTOP FOLLOW-UP VISIT: CPT

## 2018-04-09 ENCOUNTER — OTHER (OUTPATIENT)
Age: 83
End: 2018-04-09

## 2018-04-10 ENCOUNTER — APPOINTMENT (OUTPATIENT)
Dept: ORTHOPEDIC SURGERY | Facility: CLINIC | Age: 83
End: 2018-04-10

## 2020-06-22 NOTE — PATIENT PROFILE ADULT. - EXTENSIONS OF SELF_ADULT
06/22/20 1622   Final Note   Assessment Type Final Discharge Note   Anticipated Discharge Disposition Home   What phone number can be called within the next 1-3 days to see how you are doing after discharge? [phone]   Hospital Follow Up  Appt(s) scheduled? Yes   Discharge plans and expectations educations in teach back method with documentation complete? Yes     Requested 2 week post hospital appointment with Dr. JORDY Connelly. Clinic to call patient with appointment date and time.      Dentures

## 2021-03-28 NOTE — PATIENT PROFILE ADULT. - HAS THE PATIENT HAD A SIGNIFICANT CHANGE IN FUNCTIONAL STATUS DUE TO CVA, HEAD TRAUMA, ORTHOPEDIC TRAUMA/SURGERY, OR FALL, WITH THE WEEK PRIOR TO ADMISSION
[place] ED to IP Report (EDIP)    Mental Status     Alert and oriented and moving all extremities    Safety     None    Tele  Yes    Oxygen Needs  room air    Mobility    Independent    Protocols  Symptomatic cholelithiasis    ISAR          Isolation  None    Additional Information:    no

## 2022-01-05 NOTE — PROGRESS NOTE ADULT - ASSESSMENT
INTERDISCIPLINARY PLAN OF CARE CONFERENCE    Date/Time: 1/5/2022 2:15 PM  Completed by: Elizabeth Mondragon RN, Case Management      Patient Name:  Trav Esparza  YOB: 1941  Admitting Diagnosis: Hypokalemia [E87.6]  SBO (small bowel obstruction) ([facility] [address] [K70.617]  Carcinomatosis ([facility] [address] [C80.0]     Admit Date/Time:  12/30/2021  4:50 PM    Chart reviewed. Interdisciplinary team contacted or reviewed plan related to patient progress and discharge plans. Disciplines included Case Management, Nursing, and Dietitian. Current Status: INPT   PT/OT recommendation for discharge plan of care: home with prn assist    Expected D/C Disposition:  Home     Discharge Plan Comments: CM reviewed chart. Noted PT/OT recommendation for home with prn assist. CM following for potential c needs.      Home O2 in place on admit: No  Pt informed of need to bring portable home O2 tank on day of discharge for nursing to connect prior to leaving:  Not Indicated  Verbalized agreement/Understanding:  Not Indicated 86 year old  female with a PMH of Diverticula of the colon, Macular degeneration, Dementia, and arthritis arrived in the ED complaining left hip pain. She lives at a assisted living home where she was found on the ground after a fall.   X-ray showed a L femoral neck fracture on her left side.   She underwent ORIF on 9/14.      > L hip fracture s/p ORIF - needs pain control. May schedule Tyelnol 650mg TID for now.  Ct PT in acute rehab.   >Elevated BP - without h/o HTN - pain may be contributing. add norvasc if remains elevated  after pain control  > Mild Dementia - stable.  Supportive care.  > Chronic diastolic CHF - clinically euvolemic, not on any meds. was seen by Cardiology while in house.  > severe protein calorie malnutrition - nutrition eval

## 2022-08-29 NOTE — BRIEF OPERATIVE NOTE - PRE-OP
<<-----Click on this checkbox to enter Pre-Op Dx Imiquimod Counseling:  I discussed with the patient the risks of imiquimod including but not limited to erythema, scaling, itching, weeping, crusting, and pain.  Patient understands that the inflammatory response to imiquimod is variable from person to person and was educated regarded proper titration schedule.  If flu-like symptoms develop, patient knows to discontinue the medication and contact us.

## 2024-02-20 NOTE — PRE-OP CHECKLIST - RESPIRATORY RATE (BREATHS/MIN)
Onset: Sunday 2/18/24  Location / description: Heart Palpitations that comes and goes- better  now, headaches currently 6/10,  HR 85 now no palpations currently  Precipitating Factors: Palpitations  Pain Scale (1-10), 10 highest: 6/10  Associated Symptoms: throughout the day the Palpitations start, dizziness  when getting up from sititng position   What improves / worsens symptoms: no coffee for 2 days, not sure what's making it worse   Symptom specific medications: None now, last year June to October started a weight loss program Phentermine and Potassium taken everyday  LMP : Patient's last menstrual period was 01/16/2024 (approximate).  Are you pregnant or breast feeding: NO  Recent visits (last 3-4 weeks) for same reason or recent surgery: [facility] on Sunday 8PM until 1 AM, no recent surgery    PLAN:   See/Call Provider within 24 hours    Patient/Caller agrees to follow recommendations.  Reason for Disposition   Skipped or extra beat(s) and increases with exercise or exertion    Protocols used: Heart Rate and Heartbeat [id number]      Future Appointments   Date Time Provider Department Center   2/20/2024  1:00 PM Asim Eng DO [id number]        16

## 2024-11-21 NOTE — PROGRESS NOTE ADULT - PROBLEM SELECTOR PLAN 1
"Chief Complaint   Patient presents with    RECHECK     Ongoing cold issues   Patient presents to the clinic today for ongoing cold issues. 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 Estimated body mass index is 23.05 kg/m  as calculated from the following:    Height as of 12/22/20: 1.689 m (5' 6.5\").    Weight as of 10/21/24: 65.8 kg (145 lb).  Meds Reconciled: complete      Gisela Huizar LPN,LPN on 11/21/2024 at 2:27 PM  Ext. [phone]        Gisela Huizar LPN  " [facility]